# Patient Record
Sex: FEMALE | Race: OTHER
[De-identification: names, ages, dates, MRNs, and addresses within clinical notes are randomized per-mention and may not be internally consistent; named-entity substitution may affect disease eponyms.]

---

## 2017-01-25 ENCOUNTER — RESULT REVIEW (OUTPATIENT)
Age: 27
End: 2017-01-25

## 2017-01-25 ENCOUNTER — APPOINTMENT (OUTPATIENT)
Dept: OBGYN | Facility: CLINIC | Age: 27
End: 2017-01-25

## 2017-01-25 VITALS — BODY MASS INDEX: 32.31 KG/M2 | DIASTOLIC BLOOD PRESSURE: 64 MMHG | WEIGHT: 171 LBS | SYSTOLIC BLOOD PRESSURE: 110 MMHG

## 2017-01-25 LAB
BILIRUB UR QL STRIP: NORMAL
CLARITY UR: CLEAR
COLLECTION METHOD: NORMAL
GLUCOSE UR-MCNC: NORMAL
HCG UR QL: 0.2 EU/DL
HGB UR QL STRIP.AUTO: NORMAL
KETONES UR-MCNC: NORMAL
LEUKOCYTE ESTERASE UR QL STRIP: NORMAL
NITRITE UR QL STRIP: NORMAL
PH UR STRIP: 6
PROT UR STRIP-MCNC: NORMAL
SP GR UR STRIP: 1.02

## 2017-02-12 LAB — MATERNAL ALPHA FETO (NORTH): NORMAL

## 2017-02-14 ENCOUNTER — APPOINTMENT (OUTPATIENT)
Dept: OBGYN | Facility: CLINIC | Age: 27
End: 2017-02-14

## 2017-02-15 ENCOUNTER — APPOINTMENT (OUTPATIENT)
Dept: OBGYN | Facility: CLINIC | Age: 27
End: 2017-02-15

## 2017-02-15 ENCOUNTER — RESULT CHARGE (OUTPATIENT)
Age: 27
End: 2017-02-15

## 2017-02-15 ENCOUNTER — APPOINTMENT (OUTPATIENT)
Dept: ANTEPARTUM | Facility: CLINIC | Age: 27
End: 2017-02-15

## 2017-02-15 VITALS — DIASTOLIC BLOOD PRESSURE: 60 MMHG | SYSTOLIC BLOOD PRESSURE: 106 MMHG | WEIGHT: 172 LBS | BODY MASS INDEX: 32.5 KG/M2

## 2017-02-16 LAB
BILIRUB UR QL STRIP: NORMAL
CLARITY UR: CLEAR
COLLECTION METHOD: NORMAL
GLUCOSE UR-MCNC: NORMAL
HCG UR QL: 0.2 EU/DL
HGB UR QL STRIP.AUTO: NORMAL
KETONES UR-MCNC: NORMAL
LEUKOCYTE ESTERASE UR QL STRIP: NORMAL
NITRITE UR QL STRIP: NORMAL
PH UR STRIP: 7
PROT UR STRIP-MCNC: NORMAL
SP GR UR STRIP: 1.01

## 2017-03-15 ENCOUNTER — OUTPATIENT (OUTPATIENT)
Dept: OUTPATIENT SERVICES | Facility: HOSPITAL | Age: 27
LOS: 1 days | Discharge: HOME | End: 2017-03-15

## 2017-03-15 ENCOUNTER — APPOINTMENT (OUTPATIENT)
Dept: OBGYN | Facility: CLINIC | Age: 27
End: 2017-03-15

## 2017-03-15 ENCOUNTER — RESULT CHARGE (OUTPATIENT)
Age: 27
End: 2017-03-15

## 2017-03-15 VITALS
DIASTOLIC BLOOD PRESSURE: 60 MMHG | SYSTOLIC BLOOD PRESSURE: 100 MMHG | BODY MASS INDEX: 33.04 KG/M2 | HEIGHT: 61 IN | WEIGHT: 175 LBS

## 2017-03-15 LAB
BILIRUB UR QL STRIP: NEGATIVE
CLARITY UR: CLEAR
GLUCOSE UR-MCNC: NEGATIVE
HCG UR QL: 0.2 EU/DL
HGB UR QL STRIP.AUTO: NEGATIVE
KETONES UR-MCNC: NEGATIVE
LEUKOCYTE ESTERASE UR QL STRIP: NEGATIVE
NITRITE UR QL STRIP: NEGATIVE
PH UR STRIP: 6.5
PROT UR STRIP-MCNC: NEGATIVE
SP GR UR STRIP: 1

## 2017-03-17 ENCOUNTER — OTHER (OUTPATIENT)
Age: 27
End: 2017-03-17

## 2017-03-17 LAB
BASOPHILS # BLD: 0.01 TH/MM3
BASOPHILS NFR BLD: 0.1 %
EOSINOPHIL # BLD: 0.06 TH/MM3
EOSINOPHIL NFR BLD: 0.7 %
ERYTHROCYTE [DISTWIDTH] IN BLOOD BY AUTOMATED COUNT: 14 %
GLUCOSE 1H P GLC SERPL-MCNC: 149 MG/DL
GRANULOCYTES # BLD: 6.67 TH/MM3
GRANULOCYTES NFR BLD: 72.6 %
HCT VFR BLD AUTO: 38.5 %
HGB BLD-MCNC: 13 G/DL
IMM GRANULOCYTES # BLD: 0.03 TH/MM3
IMM GRANULOCYTES NFR BLD: 0.3 %
LYMPHOCYTES # BLD: 1.96 TH/MM3
LYMPHOCYTES NFR BLD: 21.3 %
MCH RBC QN AUTO: 33.2 PG
MCHC RBC AUTO-ENTMCNC: 33.8 G/DL
MCV RBC AUTO: 98.5 FL
MONOCYTES # BLD: 0.46 TH/MM3
MONOCYTES NFR BLD: 5 %
PLATELET # BLD: 269 TH/MM3
PMV BLD AUTO: 10.9 FL
RBC # BLD AUTO: 3.91 MIL/MM3
WBC # BLD: 9.19 TH/MM3

## 2017-03-24 ENCOUNTER — RESULT REVIEW (OUTPATIENT)
Age: 27
End: 2017-03-24

## 2017-03-27 LAB
GLUCOSE 1H P GLC SERPL-MCNC: 136 MG/DL
GLUCOSE 2H P GLC SERPL-MCNC: 130 MG/DL
GLUCOSE 3H P GLC SERPL-MCNC: 116 MG/DL
GLUCOSE P FAST SERPL-MCNC: 82 MG/DL

## 2017-04-05 ENCOUNTER — APPOINTMENT (OUTPATIENT)
Dept: OBGYN | Facility: CLINIC | Age: 27
End: 2017-04-05

## 2017-04-05 ENCOUNTER — RESULT CHARGE (OUTPATIENT)
Age: 27
End: 2017-04-05

## 2017-04-05 VITALS — BODY MASS INDEX: 34.01 KG/M2 | DIASTOLIC BLOOD PRESSURE: 60 MMHG | WEIGHT: 180 LBS | SYSTOLIC BLOOD PRESSURE: 94 MMHG

## 2017-04-06 LAB
BILIRUB UR QL STRIP: NORMAL
CLARITY UR: CLEAR
COLLECTION METHOD: NORMAL
GLUCOSE UR-MCNC: NORMAL
HCG UR QL: 0.2 EU/DL
HGB UR QL STRIP.AUTO: NORMAL
KETONES UR-MCNC: NORMAL
LEUKOCYTE ESTERASE UR QL STRIP: NORMAL
NITRITE UR QL STRIP: NORMAL
PH UR STRIP: 6.5
PROT UR STRIP-MCNC: NORMAL
SP GR UR STRIP: 1.01

## 2017-04-25 ENCOUNTER — RESULT CHARGE (OUTPATIENT)
Age: 27
End: 2017-04-25

## 2017-04-25 ENCOUNTER — OUTPATIENT (OUTPATIENT)
Dept: OUTPATIENT SERVICES | Facility: HOSPITAL | Age: 27
LOS: 1 days | Discharge: HOME | End: 2017-04-25

## 2017-04-25 ENCOUNTER — APPOINTMENT (OUTPATIENT)
Dept: OBGYN | Facility: CLINIC | Age: 27
End: 2017-04-25

## 2017-04-25 VITALS
DIASTOLIC BLOOD PRESSURE: 58 MMHG | SYSTOLIC BLOOD PRESSURE: 106 MMHG | HEIGHT: 61 IN | BODY MASS INDEX: 34.74 KG/M2 | WEIGHT: 184 LBS

## 2017-05-04 ENCOUNTER — APPOINTMENT (OUTPATIENT)
Dept: ANTEPARTUM | Facility: CLINIC | Age: 27
End: 2017-05-04

## 2017-05-09 ENCOUNTER — APPOINTMENT (OUTPATIENT)
Dept: OBGYN | Facility: CLINIC | Age: 27
End: 2017-05-09

## 2017-05-09 ENCOUNTER — RESULT CHARGE (OUTPATIENT)
Age: 27
End: 2017-05-09

## 2017-05-09 VITALS
DIASTOLIC BLOOD PRESSURE: 70 MMHG | WEIGHT: 184 LBS | BODY MASS INDEX: 34.74 KG/M2 | SYSTOLIC BLOOD PRESSURE: 112 MMHG | HEIGHT: 61 IN

## 2017-05-10 LAB
BILIRUB UR QL STRIP: NORMAL
CLARITY UR: CLEAR
COLLECTION METHOD: NORMAL
GLUCOSE UR-MCNC: NORMAL
HCG UR QL: 0.2 EU/DL
HGB UR QL STRIP.AUTO: NORMAL
KETONES UR-MCNC: NORMAL
LEUKOCYTE ESTERASE UR QL STRIP: NORMAL
NITRITE UR QL STRIP: NORMAL
PH UR STRIP: 6.5
PROT UR STRIP-MCNC: NORMAL
SP GR UR STRIP: 1.02

## 2017-05-23 ENCOUNTER — APPOINTMENT (OUTPATIENT)
Dept: OBGYN | Facility: CLINIC | Age: 27
End: 2017-05-23

## 2017-05-23 ENCOUNTER — RESULT CHARGE (OUTPATIENT)
Age: 27
End: 2017-05-23

## 2017-05-23 VITALS
DIASTOLIC BLOOD PRESSURE: 62 MMHG | HEIGHT: 61 IN | WEIGHT: 186 LBS | BODY MASS INDEX: 35.12 KG/M2 | SYSTOLIC BLOOD PRESSURE: 120 MMHG

## 2017-05-24 LAB
BILIRUB UR QL STRIP: NORMAL
CLARITY UR: CLEAR
COLLECTION METHOD: NORMAL
GLUCOSE UR-MCNC: NORMAL
HCG UR QL: NORMAL EU/DL
HGB UR QL STRIP.AUTO: NORMAL
KETONES UR-MCNC: NORMAL
LEUKOCYTE ESTERASE UR QL STRIP: NORMAL
NITRITE UR QL STRIP: NORMAL
PH UR STRIP: NORMAL
PROT UR STRIP-MCNC: NORMAL
SP GR UR STRIP: NORMAL

## 2017-06-01 LAB
C TRACH RRNA SPEC QL NAA+PROBE: NOT DETECTED
GP B STREP DNA SPEC QL NAA+PROBE: NORMAL
N GONORRHOEA RRNA SPEC QL NAA+PROBE: NOT DETECTED
S PYO DNA THROAT QL NAA+PROBE: NOT DETECTED
SPECIMEN SOURCE: NORMAL

## 2017-06-07 ENCOUNTER — APPOINTMENT (OUTPATIENT)
Dept: OBGYN | Facility: CLINIC | Age: 27
End: 2017-06-07

## 2017-06-07 ENCOUNTER — RESULT CHARGE (OUTPATIENT)
Age: 27
End: 2017-06-07

## 2017-06-07 VITALS
BODY MASS INDEX: 35.3 KG/M2 | WEIGHT: 187 LBS | DIASTOLIC BLOOD PRESSURE: 60 MMHG | HEIGHT: 61 IN | SYSTOLIC BLOOD PRESSURE: 100 MMHG

## 2017-06-14 ENCOUNTER — RESULT CHARGE (OUTPATIENT)
Age: 27
End: 2017-06-14

## 2017-06-14 ENCOUNTER — APPOINTMENT (OUTPATIENT)
Dept: OBGYN | Facility: CLINIC | Age: 27
End: 2017-06-14

## 2017-06-14 VITALS
BODY MASS INDEX: 35.3 KG/M2 | HEIGHT: 61 IN | DIASTOLIC BLOOD PRESSURE: 64 MMHG | WEIGHT: 187 LBS | SYSTOLIC BLOOD PRESSURE: 100 MMHG

## 2017-06-15 ENCOUNTER — EMERGENCY (EMERGENCY)
Facility: HOSPITAL | Age: 27
LOS: 0 days | Discharge: HOME | End: 2017-06-15
Admitting: OBSTETRICS & GYNECOLOGY

## 2017-06-23 ENCOUNTER — OUTPATIENT (OUTPATIENT)
Dept: OUTPATIENT SERVICES | Facility: HOSPITAL | Age: 27
LOS: 1 days | Discharge: HOME | End: 2017-06-23

## 2017-06-23 ENCOUNTER — APPOINTMENT (OUTPATIENT)
Dept: OBGYN | Facility: CLINIC | Age: 27
End: 2017-06-23

## 2017-06-23 VITALS — SYSTOLIC BLOOD PRESSURE: 108 MMHG | WEIGHT: 192 LBS | DIASTOLIC BLOOD PRESSURE: 68 MMHG | BODY MASS INDEX: 36.28 KG/M2

## 2017-06-27 ENCOUNTER — OUTPATIENT (OUTPATIENT)
Dept: OUTPATIENT SERVICES | Facility: HOSPITAL | Age: 27
LOS: 1 days | Discharge: HOME | End: 2017-06-27

## 2017-06-27 ENCOUNTER — APPOINTMENT (OUTPATIENT)
Dept: ANTEPARTUM | Facility: CLINIC | Age: 27
End: 2017-06-27

## 2017-06-27 DIAGNOSIS — O48.0 POST-TERM PREGNANCY: ICD-10-CM

## 2017-06-28 ENCOUNTER — RESULT CHARGE (OUTPATIENT)
Age: 27
End: 2017-06-28

## 2017-06-28 ENCOUNTER — APPOINTMENT (OUTPATIENT)
Dept: OBGYN | Facility: CLINIC | Age: 27
End: 2017-06-28

## 2017-06-28 ENCOUNTER — OUTPATIENT (OUTPATIENT)
Dept: OUTPATIENT SERVICES | Facility: HOSPITAL | Age: 27
LOS: 1 days | Discharge: HOME | End: 2017-06-28

## 2017-06-28 VITALS
SYSTOLIC BLOOD PRESSURE: 100 MMHG | HEIGHT: 61 IN | WEIGHT: 190 LBS | BODY MASS INDEX: 35.87 KG/M2 | DIASTOLIC BLOOD PRESSURE: 60 MMHG

## 2017-06-28 DIAGNOSIS — O47.1 FALSE LABOR AT OR AFTER 37 COMPLETED WEEKS OF GESTATION: ICD-10-CM

## 2017-06-28 DIAGNOSIS — O26.899 OTHER SPECIFIED PREGNANCY RELATED CONDITIONS, UNSPECIFIED TRIMESTER: ICD-10-CM

## 2017-06-28 DIAGNOSIS — Z03.74 ENCOUNTER FOR SUSPECTED PROBLEM WITH FETAL GROWTH RULED OUT: ICD-10-CM

## 2017-06-28 DIAGNOSIS — Z34.83 ENCOUNTER FOR SUPERVISION OF OTHER NORMAL PREGNANCY, THIRD TRIMESTER: ICD-10-CM

## 2017-06-29 ENCOUNTER — INPATIENT (INPATIENT)
Facility: HOSPITAL | Age: 27
LOS: 1 days | Discharge: HOME | End: 2017-07-01
Attending: OBSTETRICS & GYNECOLOGY | Admitting: OBSTETRICS & GYNECOLOGY

## 2017-07-05 DIAGNOSIS — Z3A.40 40 WEEKS GESTATION OF PREGNANCY: ICD-10-CM

## 2017-07-05 DIAGNOSIS — O48.0 POST-TERM PREGNANCY: ICD-10-CM

## 2017-07-05 DIAGNOSIS — Z33.1 PREGNANT STATE, INCIDENTAL: ICD-10-CM

## 2017-08-02 ENCOUNTER — APPOINTMENT (OUTPATIENT)
Dept: OBGYN | Facility: CLINIC | Age: 27
End: 2017-08-02

## 2017-08-02 ENCOUNTER — RESULT CHARGE (OUTPATIENT)
Age: 27
End: 2017-08-02

## 2017-08-02 ENCOUNTER — OUTPATIENT (OUTPATIENT)
Dept: OUTPATIENT SERVICES | Facility: HOSPITAL | Age: 27
LOS: 1 days | Discharge: HOME | End: 2017-08-02

## 2017-08-02 VITALS — BODY MASS INDEX: 32.03 KG/M2 | WEIGHT: 169.5 LBS | SYSTOLIC BLOOD PRESSURE: 100 MMHG | DIASTOLIC BLOOD PRESSURE: 60 MMHG

## 2017-08-02 LAB
HCG UR QL: NEGATIVE
QUALITY CONTROL: YES

## 2017-08-08 ENCOUNTER — RESULT REVIEW (OUTPATIENT)
Age: 27
End: 2017-08-08

## 2017-08-23 ENCOUNTER — OUTPATIENT (OUTPATIENT)
Dept: OUTPATIENT SERVICES | Facility: HOSPITAL | Age: 27
LOS: 1 days | Discharge: HOME | End: 2017-08-23

## 2017-08-23 ENCOUNTER — APPOINTMENT (OUTPATIENT)
Dept: OBGYN | Facility: CLINIC | Age: 27
End: 2017-08-23

## 2017-08-23 VITALS — DIASTOLIC BLOOD PRESSURE: 60 MMHG | BODY MASS INDEX: 31.74 KG/M2 | WEIGHT: 168 LBS | SYSTOLIC BLOOD PRESSURE: 100 MMHG

## 2017-08-24 LAB
C TRACH RRNA SPEC QL NAA+PROBE: NOT DETECTED
N GONORRHOEA RRNA SPEC QL NAA+PROBE: NOT DETECTED

## 2017-09-27 ENCOUNTER — OUTPATIENT (OUTPATIENT)
Dept: OUTPATIENT SERVICES | Facility: HOSPITAL | Age: 27
LOS: 1 days | Discharge: HOME | End: 2017-09-27

## 2017-09-27 ENCOUNTER — APPOINTMENT (OUTPATIENT)
Dept: OBGYN | Facility: CLINIC | Age: 27
End: 2017-09-27

## 2017-09-27 VITALS — BODY MASS INDEX: 31.35 KG/M2 | WEIGHT: 165.9 LBS | DIASTOLIC BLOOD PRESSURE: 62 MMHG | SYSTOLIC BLOOD PRESSURE: 100 MMHG

## 2017-11-08 DIAGNOSIS — Z34.82 ENCOUNTER FOR SUPERVISION OF OTHER NORMAL PREGNANCY, SECOND TRIMESTER: ICD-10-CM

## 2018-09-11 ENCOUNTER — OUTPATIENT (OUTPATIENT)
Dept: OUTPATIENT SERVICES | Facility: HOSPITAL | Age: 28
LOS: 1 days | Discharge: HOME | End: 2018-09-11

## 2018-09-11 ENCOUNTER — RESULT CHARGE (OUTPATIENT)
Age: 28
End: 2018-09-11

## 2018-09-11 ENCOUNTER — APPOINTMENT (OUTPATIENT)
Dept: OBGYN | Facility: CLINIC | Age: 28
End: 2018-09-11

## 2018-09-11 VITALS
DIASTOLIC BLOOD PRESSURE: 70 MMHG | WEIGHT: 153 LBS | SYSTOLIC BLOOD PRESSURE: 100 MMHG | HEIGHT: 61 IN | BODY MASS INDEX: 28.89 KG/M2

## 2018-09-11 DIAGNOSIS — Z30.432 ENCOUNTER FOR REMOVAL OF INTRAUTERINE CONTRACEPTIVE DEVICE: ICD-10-CM

## 2018-09-12 LAB
HCG UR QL: NEGATIVE
QUALITY CONTROL: NO

## 2018-11-16 ENCOUNTER — APPOINTMENT (OUTPATIENT)
Dept: OBGYN | Facility: CLINIC | Age: 28
End: 2018-11-16

## 2018-12-04 ENCOUNTER — APPOINTMENT (OUTPATIENT)
Dept: OBGYN | Facility: CLINIC | Age: 28
End: 2018-12-04

## 2018-12-04 ENCOUNTER — LABORATORY RESULT (OUTPATIENT)
Age: 28
End: 2018-12-04

## 2018-12-04 ENCOUNTER — OUTPATIENT (OUTPATIENT)
Dept: OUTPATIENT SERVICES | Facility: HOSPITAL | Age: 28
LOS: 1 days | Discharge: HOME | End: 2018-12-04

## 2018-12-04 ENCOUNTER — NON-APPOINTMENT (OUTPATIENT)
Age: 28
End: 2018-12-04

## 2018-12-04 ENCOUNTER — RESULT CHARGE (OUTPATIENT)
Age: 28
End: 2018-12-04

## 2018-12-04 VITALS
HEIGHT: 61 IN | WEIGHT: 157 LBS | DIASTOLIC BLOOD PRESSURE: 70 MMHG | SYSTOLIC BLOOD PRESSURE: 110 MMHG | BODY MASS INDEX: 29.64 KG/M2

## 2018-12-05 DIAGNOSIS — Z34.91 ENCOUNTER FOR SUPERVISION OF NORMAL PREGNANCY, UNSPECIFIED, FIRST TRIMESTER: ICD-10-CM

## 2018-12-06 ENCOUNTER — APPOINTMENT (OUTPATIENT)
Dept: ANTEPARTUM | Facility: CLINIC | Age: 28
End: 2018-12-06

## 2018-12-06 ENCOUNTER — NON-APPOINTMENT (OUTPATIENT)
Age: 28
End: 2018-12-06

## 2018-12-06 LAB
ABO + RH PNL BLD: NORMAL
BACTERIA UR CULT: NORMAL
BASOPHILS # BLD AUTO: 0.05 K/UL
BASOPHILS NFR BLD AUTO: 0.7 %
BILIRUB UR QL STRIP: NORMAL
BLD GP AB SCN SERPL QL: NORMAL
C TRACH RRNA SPEC QL NAA+PROBE: NOT DETECTED
CLARITY UR: CLEAR
COLLECTION METHOD: NORMAL
EOSINOPHIL # BLD AUTO: 0.12 K/UL
EOSINOPHIL NFR BLD AUTO: 1.6 %
GLUCOSE UR-MCNC: NORMAL
HBV SURFACE AG SER QL: NONREACTIVE
HCG UR QL: 0.2 EU/DL
HCT VFR BLD CALC: 39.4 %
HGB BLD-MCNC: 13.5 G/DL
HGB UR QL STRIP.AUTO: NORMAL
HIV1+2 AB SPEC QL IA.RAPID: NONREACTIVE
IMM GRANULOCYTES NFR BLD AUTO: 0.4 %
KETONES UR-MCNC: NORMAL
LEUKOCYTE ESTERASE UR QL STRIP: NORMAL
LYMPHOCYTES # BLD AUTO: 1.82 K/UL
LYMPHOCYTES NFR BLD AUTO: 24.7 %
MAN DIFF?: NORMAL
MCHC RBC-ENTMCNC: 32.3 PG
MCHC RBC-ENTMCNC: 34.3 G/DL
MCV RBC AUTO: 94.3 FL
MONOCYTES # BLD AUTO: 0.42 K/UL
MONOCYTES NFR BLD AUTO: 5.7 %
N GONORRHOEA RRNA SPEC QL NAA+PROBE: NOT DETECTED
NEUTROPHILS # BLD AUTO: 4.94 K/UL
NEUTROPHILS NFR BLD AUTO: 66.9 %
NITRITE UR QL STRIP: NORMAL
PH UR STRIP: 6.5
PLATELET # BLD AUTO: 293 K/UL
PROT UR STRIP-MCNC: NORMAL
RBC # BLD: 4.18 M/UL
RBC # FLD: 13.2 %
RUBV IGG FLD-ACNC: 3.4 INDEX
RUBV IGG SER-IMP: POSITIVE
SOURCE AMPLIFICATION: NORMAL
SP GR UR STRIP: 1.02
T PALLIDUM AB SER QL IA: NEGATIVE
VZV AB TITR SER: POSITIVE
VZV IGG SER IF-ACNC: 280.5 INDEX
WBC # FLD AUTO: 7.38 K/UL

## 2018-12-07 DIAGNOSIS — O35.1XX0 MATERNAL CARE FOR (SUSPECTED) CHROMOSOMAL ABNORMALITY IN FETUS, NOT APPLICABLE OR UNSPECIFIED: ICD-10-CM

## 2018-12-07 LAB
HGB A MFR BLD: 97.6 %
HGB A2 MFR BLD: 2.4 %
HGB FRACT BLD-IMP: NORMAL

## 2018-12-10 LAB
AR GENE MUT ANL BLD/T: NEGATIVE
CFTR MUT TESTED BLD/T: NEGATIVE
LEAD BLD-MCNC: <1 UG/DL

## 2019-01-02 ENCOUNTER — LABORATORY RESULT (OUTPATIENT)
Age: 29
End: 2019-01-02

## 2019-01-02 ENCOUNTER — APPOINTMENT (OUTPATIENT)
Dept: OBGYN | Facility: CLINIC | Age: 29
End: 2019-01-02

## 2019-01-02 ENCOUNTER — OUTPATIENT (OUTPATIENT)
Dept: OUTPATIENT SERVICES | Facility: HOSPITAL | Age: 29
LOS: 1 days | Discharge: HOME | End: 2019-01-02

## 2019-01-02 VITALS
HEIGHT: 61 IN | WEIGHT: 159 LBS | BODY MASS INDEX: 30.02 KG/M2 | DIASTOLIC BLOOD PRESSURE: 60 MMHG | SYSTOLIC BLOOD PRESSURE: 100 MMHG

## 2019-01-02 DIAGNOSIS — Z34.90 ENCOUNTER FOR SUPERVISION OF NORMAL PREGNANCY, UNSPECIFIED, UNSPECIFIED TRIMESTER: ICD-10-CM

## 2019-01-03 DIAGNOSIS — Z34.92 ENCOUNTER FOR SUPERVISION OF NORMAL PREGNANCY, UNSPECIFIED, SECOND TRIMESTER: ICD-10-CM

## 2019-01-30 ENCOUNTER — NON-APPOINTMENT (OUTPATIENT)
Age: 29
End: 2019-01-30

## 2019-01-30 ENCOUNTER — OUTPATIENT (OUTPATIENT)
Dept: OUTPATIENT SERVICES | Facility: HOSPITAL | Age: 29
LOS: 1 days | Discharge: HOME | End: 2019-01-30

## 2019-01-30 ENCOUNTER — RESULT CHARGE (OUTPATIENT)
Age: 29
End: 2019-01-30

## 2019-01-30 ENCOUNTER — APPOINTMENT (OUTPATIENT)
Dept: OBGYN | Facility: CLINIC | Age: 29
End: 2019-01-30

## 2019-01-30 VITALS
SYSTOLIC BLOOD PRESSURE: 100 MMHG | DIASTOLIC BLOOD PRESSURE: 70 MMHG | WEIGHT: 163 LBS | BODY MASS INDEX: 30.78 KG/M2 | HEIGHT: 61 IN

## 2019-01-31 ENCOUNTER — OUTPATIENT (OUTPATIENT)
Dept: OUTPATIENT SERVICES | Facility: HOSPITAL | Age: 29
LOS: 1 days | Discharge: HOME | End: 2019-01-31

## 2019-01-31 ENCOUNTER — APPOINTMENT (OUTPATIENT)
Dept: ANTEPARTUM | Facility: CLINIC | Age: 29
End: 2019-01-31

## 2019-01-31 DIAGNOSIS — Z34.92 ENCOUNTER FOR SUPERVISION OF NORMAL PREGNANCY, UNSPECIFIED, SECOND TRIMESTER: ICD-10-CM

## 2019-02-01 LAB
BILIRUB UR QL STRIP: NORMAL
CLARITY UR: CLEAR
COLLECTION METHOD: NORMAL
GLUCOSE UR-MCNC: NORMAL
HCG UR QL: NORMAL EU/DL
HGB UR QL STRIP.AUTO: NORMAL
KETONES UR-MCNC: NORMAL
LEUKOCYTE ESTERASE UR QL STRIP: NORMAL
NITRITE UR QL STRIP: NORMAL
PH UR STRIP: 6
PROT UR STRIP-MCNC: NORMAL
SP GR UR STRIP: 1.01

## 2019-02-07 DIAGNOSIS — O35.8XX0 MATERNAL CARE FOR OTHER (SUSPECTED) FETAL ABNORMALITY AND DAMAGE, NOT APPLICABLE OR UNSPECIFIED: ICD-10-CM

## 2019-03-05 ENCOUNTER — OUTPATIENT (OUTPATIENT)
Dept: OUTPATIENT SERVICES | Facility: HOSPITAL | Age: 29
LOS: 1 days | Discharge: HOME | End: 2019-03-05

## 2019-03-05 ENCOUNTER — NON-APPOINTMENT (OUTPATIENT)
Age: 29
End: 2019-03-05

## 2019-03-05 ENCOUNTER — OTHER (OUTPATIENT)
Age: 29
End: 2019-03-05

## 2019-03-05 ENCOUNTER — APPOINTMENT (OUTPATIENT)
Dept: OBGYN | Facility: CLINIC | Age: 29
End: 2019-03-05

## 2019-03-05 VITALS — SYSTOLIC BLOOD PRESSURE: 100 MMHG | WEIGHT: 163 LBS | DIASTOLIC BLOOD PRESSURE: 70 MMHG | BODY MASS INDEX: 30.8 KG/M2

## 2019-03-08 DIAGNOSIS — Z34.92 ENCOUNTER FOR SUPERVISION OF NORMAL PREGNANCY, UNSPECIFIED, SECOND TRIMESTER: ICD-10-CM

## 2019-04-01 ENCOUNTER — APPOINTMENT (OUTPATIENT)
Dept: OBGYN | Facility: CLINIC | Age: 29
End: 2019-04-01

## 2019-04-01 ENCOUNTER — OUTPATIENT (OUTPATIENT)
Dept: OUTPATIENT SERVICES | Facility: HOSPITAL | Age: 29
LOS: 1 days | Discharge: HOME | End: 2019-04-01

## 2019-04-01 ENCOUNTER — NON-APPOINTMENT (OUTPATIENT)
Age: 29
End: 2019-04-01

## 2019-04-01 VITALS
SYSTOLIC BLOOD PRESSURE: 110 MMHG | DIASTOLIC BLOOD PRESSURE: 80 MMHG | WEIGHT: 169.44 LBS | BODY MASS INDEX: 31.99 KG/M2 | HEIGHT: 61 IN

## 2019-04-02 DIAGNOSIS — Z34.93 ENCOUNTER FOR SUPERVISION OF NORMAL PREGNANCY, UNSPECIFIED, THIRD TRIMESTER: ICD-10-CM

## 2019-04-02 LAB
BILIRUB UR QL STRIP: NORMAL
CLARITY UR: CLEAR
COLLECTION METHOD: NORMAL
GLUCOSE UR-MCNC: NORMAL
HCG UR QL: 0.2 EU/DL
HGB UR QL STRIP.AUTO: NORMAL
KETONES UR-MCNC: NORMAL
LEUKOCYTE ESTERASE UR QL STRIP: NORMAL
NITRITE UR QL STRIP: NORMAL
PH UR STRIP: 5
PROT UR STRIP-MCNC: NORMAL
SP GR UR STRIP: 1.01

## 2019-04-15 ENCOUNTER — NON-APPOINTMENT (OUTPATIENT)
Age: 29
End: 2019-04-15

## 2019-04-15 ENCOUNTER — OTHER (OUTPATIENT)
Age: 29
End: 2019-04-15

## 2019-04-15 ENCOUNTER — OUTPATIENT (OUTPATIENT)
Dept: OUTPATIENT SERVICES | Facility: HOSPITAL | Age: 29
LOS: 1 days | Discharge: HOME | End: 2019-04-15

## 2019-04-15 ENCOUNTER — RESULT CHARGE (OUTPATIENT)
Age: 29
End: 2019-04-15

## 2019-04-15 ENCOUNTER — APPOINTMENT (OUTPATIENT)
Dept: OBGYN | Facility: CLINIC | Age: 29
End: 2019-04-15

## 2019-04-15 VITALS
HEIGHT: 61 IN | BODY MASS INDEX: 31.53 KG/M2 | DIASTOLIC BLOOD PRESSURE: 64 MMHG | SYSTOLIC BLOOD PRESSURE: 100 MMHG | WEIGHT: 167 LBS

## 2019-04-15 LAB
BASOPHILS # BLD AUTO: 0.03 K/UL
BASOPHILS NFR BLD AUTO: 0.3 %
EOSINOPHIL # BLD AUTO: 0.12 K/UL
EOSINOPHIL NFR BLD AUTO: 1.1 %
GLUCOSE 1H P 50 G GLC PO SERPL-MCNC: 121 MG/DL
HCT VFR BLD CALC: 37 %
HGB BLD-MCNC: 12.5 G/DL
IMM GRANULOCYTES NFR BLD AUTO: 0.8 %
LYMPHOCYTES # BLD AUTO: 1.63 K/UL
LYMPHOCYTES NFR BLD AUTO: 15.4 %
MAN DIFF?: NORMAL
MCHC RBC-ENTMCNC: 32.6 PG
MCHC RBC-ENTMCNC: 33.8 G/DL
MCV RBC AUTO: 96.4 FL
MONOCYTES # BLD AUTO: 0.55 K/UL
MONOCYTES NFR BLD AUTO: 5.2 %
NEUTROPHILS # BLD AUTO: 8.17 K/UL
NEUTROPHILS NFR BLD AUTO: 77.2 %
PLATELET # BLD AUTO: 270 K/UL
RBC # BLD: 3.84 M/UL
RBC # FLD: 13.4 %
WBC # FLD AUTO: 10.59 K/UL

## 2019-04-15 RX ORDER — TERCONAZOLE 4 MG/G
0.4 CREAM VAGINAL
Qty: 1 | Refills: 1 | Status: ACTIVE | COMMUNITY
Start: 2019-04-15 | End: 1900-01-01

## 2019-04-18 DIAGNOSIS — Z34.93 ENCOUNTER FOR SUPERVISION OF NORMAL PREGNANCY, UNSPECIFIED, THIRD TRIMESTER: ICD-10-CM

## 2019-04-22 ENCOUNTER — APPOINTMENT (OUTPATIENT)
Dept: ANTEPARTUM | Facility: CLINIC | Age: 29
End: 2019-04-22

## 2019-04-22 DIAGNOSIS — O35.8XX0 MATERNAL CARE FOR OTHER (SUSPECTED) FETAL ABNORMALITY AND DAMAGE, NOT APPLICABLE OR UNSPECIFIED: ICD-10-CM

## 2019-04-29 ENCOUNTER — APPOINTMENT (OUTPATIENT)
Dept: OBGYN | Facility: CLINIC | Age: 29
End: 2019-04-29

## 2019-04-29 ENCOUNTER — NON-APPOINTMENT (OUTPATIENT)
Age: 29
End: 2019-04-29

## 2019-04-29 ENCOUNTER — RESULT CHARGE (OUTPATIENT)
Age: 29
End: 2019-04-29

## 2019-04-29 ENCOUNTER — OUTPATIENT (OUTPATIENT)
Dept: OUTPATIENT SERVICES | Facility: HOSPITAL | Age: 29
LOS: 1 days | Discharge: HOME | End: 2019-04-29

## 2019-04-29 VITALS — WEIGHT: 172 LBS | BODY MASS INDEX: 32.5 KG/M2 | DIASTOLIC BLOOD PRESSURE: 60 MMHG | SYSTOLIC BLOOD PRESSURE: 90 MMHG

## 2019-04-30 DIAGNOSIS — Z34.93 ENCOUNTER FOR SUPERVISION OF NORMAL PREGNANCY, UNSPECIFIED, THIRD TRIMESTER: ICD-10-CM

## 2019-05-14 ENCOUNTER — OUTPATIENT (OUTPATIENT)
Dept: OUTPATIENT SERVICES | Facility: HOSPITAL | Age: 29
LOS: 1 days | Discharge: HOME | End: 2019-05-14

## 2019-05-14 ENCOUNTER — APPOINTMENT (OUTPATIENT)
Dept: OBGYN | Facility: CLINIC | Age: 29
End: 2019-05-14
Payer: MEDICAID

## 2019-05-14 ENCOUNTER — NON-APPOINTMENT (OUTPATIENT)
Age: 29
End: 2019-05-14

## 2019-05-14 VITALS
SYSTOLIC BLOOD PRESSURE: 110 MMHG | BODY MASS INDEX: 32.47 KG/M2 | HEIGHT: 61 IN | DIASTOLIC BLOOD PRESSURE: 62 MMHG | WEIGHT: 172 LBS

## 2019-05-14 PROCEDURE — 99213 OFFICE O/P EST LOW 20 MIN: CPT

## 2019-05-15 DIAGNOSIS — Z34.90 ENCOUNTER FOR SUPERVISION OF NORMAL PREGNANCY, UNSPECIFIED, UNSPECIFIED TRIMESTER: ICD-10-CM

## 2019-05-15 LAB
C TRACH RRNA SPEC QL NAA+PROBE: NOT DETECTED
N GONORRHOEA RRNA SPEC QL NAA+PROBE: NOT DETECTED
SOURCE AMPLIFICATION: NORMAL

## 2019-05-16 LAB
BASOPHILS # BLD AUTO: 0.04 K/UL
BASOPHILS NFR BLD AUTO: 0.4 %
EOSINOPHIL # BLD AUTO: 0.16 K/UL
EOSINOPHIL NFR BLD AUTO: 1.5 %
GP B STREP DNA SPEC QL NAA+PROBE: NORMAL
GP B STREP DNA SPEC QL NAA+PROBE: NOT DETECTED
HCT VFR BLD CALC: 39.8 %
HGB BLD-MCNC: 13.5 G/DL
HIV1+2 AB SPEC QL IA.RAPID: NONREACTIVE
IMM GRANULOCYTES NFR BLD AUTO: 0.8 %
LYMPHOCYTES # BLD AUTO: 2.49 K/UL
LYMPHOCYTES NFR BLD AUTO: 23.9 %
MAN DIFF?: NORMAL
MCHC RBC-ENTMCNC: 32.1 PG
MCHC RBC-ENTMCNC: 33.9 G/DL
MCV RBC AUTO: 94.8 FL
MONOCYTES # BLD AUTO: 0.6 K/UL
MONOCYTES NFR BLD AUTO: 5.7 %
NEUTROPHILS # BLD AUTO: 7.07 K/UL
NEUTROPHILS NFR BLD AUTO: 67.7 %
PLATELET # BLD AUTO: 302 K/UL
RBC # BLD: 4.2 M/UL
RBC # FLD: 13.2 %
SOURCE GBS: NORMAL
WBC # FLD AUTO: 10.44 K/UL

## 2019-05-17 LAB
MEV IGG FLD QL IA: <5 AU/ML
MEV IGG+IGM SER-IMP: NEGATIVE

## 2019-05-21 ENCOUNTER — OUTPATIENT (OUTPATIENT)
Dept: OUTPATIENT SERVICES | Facility: HOSPITAL | Age: 29
LOS: 1 days | Discharge: HOME | End: 2019-05-21

## 2019-05-21 ENCOUNTER — RESULT CHARGE (OUTPATIENT)
Age: 29
End: 2019-05-21

## 2019-05-21 ENCOUNTER — APPOINTMENT (OUTPATIENT)
Dept: OBGYN | Facility: CLINIC | Age: 29
End: 2019-05-21
Payer: MEDICAID

## 2019-05-21 VITALS — BODY MASS INDEX: 32.69 KG/M2 | DIASTOLIC BLOOD PRESSURE: 70 MMHG | WEIGHT: 173 LBS | SYSTOLIC BLOOD PRESSURE: 120 MMHG

## 2019-05-21 PROCEDURE — 99213 OFFICE O/P EST LOW 20 MIN: CPT

## 2019-05-22 LAB
BILIRUB UR QL STRIP: NORMAL
CLARITY UR: CLEAR
COLLECTION METHOD: NORMAL
GLUCOSE UR-MCNC: NORMAL
HCG UR QL: 0.2 EU/DL
HGB UR QL STRIP.AUTO: NORMAL
KETONES UR-MCNC: NORMAL
LEUKOCYTE ESTERASE UR QL STRIP: NORMAL
NITRITE UR QL STRIP: NORMAL
PH UR STRIP: 7.5
PROT UR STRIP-MCNC: NORMAL
SP GR UR STRIP: 1.01

## 2019-05-23 DIAGNOSIS — Z34.80 ENCOUNTER FOR SUPERVISION OF OTHER NORMAL PREGNANCY, UNSPECIFIED TRIMESTER: ICD-10-CM

## 2019-05-29 ENCOUNTER — OUTPATIENT (OUTPATIENT)
Dept: OUTPATIENT SERVICES | Facility: HOSPITAL | Age: 29
LOS: 1 days | Discharge: HOME | End: 2019-05-29

## 2019-05-29 ENCOUNTER — NON-APPOINTMENT (OUTPATIENT)
Age: 29
End: 2019-05-29

## 2019-05-29 ENCOUNTER — APPOINTMENT (OUTPATIENT)
Dept: OBGYN | Facility: CLINIC | Age: 29
End: 2019-05-29
Payer: MEDICAID

## 2019-05-29 ENCOUNTER — RESULT CHARGE (OUTPATIENT)
Age: 29
End: 2019-05-29

## 2019-05-29 VITALS
BODY MASS INDEX: 33.23 KG/M2 | DIASTOLIC BLOOD PRESSURE: 64 MMHG | WEIGHT: 176 LBS | SYSTOLIC BLOOD PRESSURE: 116 MMHG | HEIGHT: 61 IN

## 2019-05-29 LAB
BILIRUB UR QL STRIP: NORMAL
CLARITY UR: CLEAR
COLLECTION METHOD: NORMAL
GLUCOSE UR-MCNC: NORMAL
HCG UR QL: 0.2 EU/DL
HGB UR QL STRIP.AUTO: NORMAL
KETONES UR-MCNC: NORMAL
LEUKOCYTE ESTERASE UR QL STRIP: NORMAL
NITRITE UR QL STRIP: NORMAL
PH UR STRIP: 6
PROT UR STRIP-MCNC: NORMAL
SP GR UR STRIP: 1.01

## 2019-05-29 PROCEDURE — 99213 OFFICE O/P EST LOW 20 MIN: CPT

## 2019-05-30 DIAGNOSIS — Z34.80 ENCOUNTER FOR SUPERVISION OF OTHER NORMAL PREGNANCY, UNSPECIFIED TRIMESTER: ICD-10-CM

## 2019-06-05 ENCOUNTER — NON-APPOINTMENT (OUTPATIENT)
Age: 29
End: 2019-06-05

## 2019-06-05 ENCOUNTER — OUTPATIENT (OUTPATIENT)
Dept: OUTPATIENT SERVICES | Facility: HOSPITAL | Age: 29
LOS: 1 days | Discharge: HOME | End: 2019-06-05

## 2019-06-05 ENCOUNTER — RESULT CHARGE (OUTPATIENT)
Age: 29
End: 2019-06-05

## 2019-06-05 ENCOUNTER — APPOINTMENT (OUTPATIENT)
Dept: OBGYN | Facility: CLINIC | Age: 29
End: 2019-06-05
Payer: MEDICAID

## 2019-06-05 VITALS
DIASTOLIC BLOOD PRESSURE: 70 MMHG | BODY MASS INDEX: 33.61 KG/M2 | SYSTOLIC BLOOD PRESSURE: 104 MMHG | WEIGHT: 178 LBS | HEIGHT: 61 IN

## 2019-06-05 PROCEDURE — 99213 OFFICE O/P EST LOW 20 MIN: CPT

## 2019-06-06 ENCOUNTER — INPATIENT (INPATIENT)
Facility: HOSPITAL | Age: 29
LOS: 1 days | Discharge: HOME | End: 2019-06-08
Attending: OBSTETRICS & GYNECOLOGY | Admitting: OBSTETRICS & GYNECOLOGY
Payer: MEDICAID

## 2019-06-06 VITALS — TEMPERATURE: 98 F | SYSTOLIC BLOOD PRESSURE: 107 MMHG | HEART RATE: 90 BPM | DIASTOLIC BLOOD PRESSURE: 73 MMHG

## 2019-06-06 DIAGNOSIS — Z34.80 ENCOUNTER FOR SUPERVISION OF OTHER NORMAL PREGNANCY, UNSPECIFIED TRIMESTER: ICD-10-CM

## 2019-06-06 LAB
AMPHET UR-MCNC: NEGATIVE — SIGNIFICANT CHANGE UP
APPEARANCE UR: CLEAR — SIGNIFICANT CHANGE UP
BARBITURATES UR SCN-MCNC: NEGATIVE — SIGNIFICANT CHANGE UP
BASOPHILS # BLD AUTO: 0.05 K/UL — SIGNIFICANT CHANGE UP (ref 0–0.2)
BASOPHILS NFR BLD AUTO: 0.5 % — SIGNIFICANT CHANGE UP (ref 0–1)
BENZODIAZ UR-MCNC: NEGATIVE — SIGNIFICANT CHANGE UP
BILIRUB UR-MCNC: NEGATIVE — SIGNIFICANT CHANGE UP
BLD GP AB SCN SERPL QL: SIGNIFICANT CHANGE UP
BUPRENORPHINE SCREEN, URINE RESULT: NEGATIVE — SIGNIFICANT CHANGE UP
COCAINE METAB.OTHER UR-MCNC: NEGATIVE — SIGNIFICANT CHANGE UP
COLOR SPEC: YELLOW — SIGNIFICANT CHANGE UP
DIFF PNL FLD: NEGATIVE — SIGNIFICANT CHANGE UP
EOSINOPHIL # BLD AUTO: 0.11 K/UL — SIGNIFICANT CHANGE UP (ref 0–0.7)
EOSINOPHIL NFR BLD AUTO: 1.1 % — SIGNIFICANT CHANGE UP (ref 0–8)
GLUCOSE UR QL: NEGATIVE MG/DL — SIGNIFICANT CHANGE UP
HCT VFR BLD CALC: 38 % — SIGNIFICANT CHANGE UP (ref 37–47)
HGB BLD-MCNC: 13.3 G/DL — SIGNIFICANT CHANGE UP (ref 12–16)
IMM GRANULOCYTES NFR BLD AUTO: 1 % — HIGH (ref 0.1–0.3)
KETONES UR-MCNC: NEGATIVE — SIGNIFICANT CHANGE UP
L&D DRUG SCREEN, URINE: SIGNIFICANT CHANGE UP
LEUKOCYTE ESTERASE UR-ACNC: NEGATIVE — SIGNIFICANT CHANGE UP
LYMPHOCYTES # BLD AUTO: 1.95 K/UL — SIGNIFICANT CHANGE UP (ref 1.2–3.4)
LYMPHOCYTES # BLD AUTO: 18.7 % — LOW (ref 20.5–51.1)
MCHC RBC-ENTMCNC: 32.5 PG — HIGH (ref 27–31)
MCHC RBC-ENTMCNC: 35 G/DL — SIGNIFICANT CHANGE UP (ref 32–37)
MCV RBC AUTO: 92.9 FL — SIGNIFICANT CHANGE UP (ref 81–99)
METHADONE UR-MCNC: NEGATIVE — SIGNIFICANT CHANGE UP
MONOCYTES # BLD AUTO: 0.55 K/UL — SIGNIFICANT CHANGE UP (ref 0.1–0.6)
MONOCYTES NFR BLD AUTO: 5.3 % — SIGNIFICANT CHANGE UP (ref 1.7–9.3)
NEUTROPHILS # BLD AUTO: 7.69 K/UL — HIGH (ref 1.4–6.5)
NEUTROPHILS NFR BLD AUTO: 73.4 % — SIGNIFICANT CHANGE UP (ref 42.2–75.2)
NITRITE UR-MCNC: NEGATIVE — SIGNIFICANT CHANGE UP
NRBC # BLD: 0 /100 WBCS — SIGNIFICANT CHANGE UP (ref 0–0)
OPIATES UR-MCNC: NEGATIVE — SIGNIFICANT CHANGE UP
OXYCODONE UR-MCNC: NEGATIVE — SIGNIFICANT CHANGE UP
PCP UR-MCNC: NEGATIVE — SIGNIFICANT CHANGE UP
PH UR: 7 — SIGNIFICANT CHANGE UP (ref 5–8)
PLATELET # BLD AUTO: 256 K/UL — SIGNIFICANT CHANGE UP (ref 130–400)
PRENATAL SYPHILIS TEST: SIGNIFICANT CHANGE UP
PROPOXYPHENE QUALITATIVE URINE RESULT: NEGATIVE — SIGNIFICANT CHANGE UP
PROT UR-MCNC: NEGATIVE MG/DL — SIGNIFICANT CHANGE UP
RBC # BLD: 4.09 M/UL — LOW (ref 4.2–5.4)
RBC # FLD: 13.4 % — SIGNIFICANT CHANGE UP (ref 11.5–14.5)
SP GR SPEC: 1.01 — SIGNIFICANT CHANGE UP (ref 1.01–1.03)
UROBILINOGEN FLD QL: 0.2 MG/DL — SIGNIFICANT CHANGE UP (ref 0.2–0.2)
WBC # BLD: 10.45 K/UL — SIGNIFICANT CHANGE UP (ref 4.8–10.8)
WBC # FLD AUTO: 10.45 K/UL — SIGNIFICANT CHANGE UP (ref 4.8–10.8)

## 2019-06-06 PROCEDURE — 59409 OBSTETRICAL CARE: CPT | Mod: U9

## 2019-06-06 RX ORDER — SIMETHICONE 80 MG/1
80 TABLET, CHEWABLE ORAL EVERY 4 HOURS
Refills: 0 | Status: DISCONTINUED | OUTPATIENT
Start: 2019-06-06 | End: 2019-06-08

## 2019-06-06 RX ORDER — OXYTOCIN 10 UNIT/ML
2 VIAL (ML) INJECTION
Qty: 30 | Refills: 0 | Status: DISCONTINUED | OUTPATIENT
Start: 2019-06-06 | End: 2019-06-08

## 2019-06-06 RX ORDER — BENZOCAINE 10 %
1 GEL (GRAM) MUCOUS MEMBRANE EVERY 6 HOURS
Refills: 0 | Status: DISCONTINUED | OUTPATIENT
Start: 2019-06-06 | End: 2019-06-08

## 2019-06-06 RX ORDER — OXYCODONE HYDROCHLORIDE 5 MG/1
5 TABLET ORAL
Refills: 0 | Status: DISCONTINUED | OUTPATIENT
Start: 2019-06-06 | End: 2019-06-08

## 2019-06-06 RX ORDER — SODIUM CHLORIDE 9 MG/ML
3 INJECTION INTRAMUSCULAR; INTRAVENOUS; SUBCUTANEOUS EVERY 8 HOURS
Refills: 0 | Status: DISCONTINUED | OUTPATIENT
Start: 2019-06-06 | End: 2019-06-08

## 2019-06-06 RX ORDER — OXYTOCIN 10 UNIT/ML
41.67 VIAL (ML) INJECTION
Qty: 20 | Refills: 0 | Status: DISCONTINUED | OUTPATIENT
Start: 2019-06-06 | End: 2019-06-08

## 2019-06-06 RX ORDER — DIBUCAINE 1 %
1 OINTMENT (GRAM) RECTAL EVERY 6 HOURS
Refills: 0 | Status: DISCONTINUED | OUTPATIENT
Start: 2019-06-06 | End: 2019-06-08

## 2019-06-06 RX ORDER — OXYTOCIN 10 UNIT/ML
333.33 VIAL (ML) INJECTION
Qty: 20 | Refills: 0 | Status: DISCONTINUED | OUTPATIENT
Start: 2019-06-06 | End: 2019-06-08

## 2019-06-06 RX ORDER — DIPHENHYDRAMINE HCL 50 MG
25 CAPSULE ORAL EVERY 6 HOURS
Refills: 0 | Status: DISCONTINUED | OUTPATIENT
Start: 2019-06-06 | End: 2019-06-08

## 2019-06-06 RX ORDER — LANOLIN
1 OINTMENT (GRAM) TOPICAL EVERY 6 HOURS
Refills: 0 | Status: DISCONTINUED | OUTPATIENT
Start: 2019-06-06 | End: 2019-06-08

## 2019-06-06 RX ORDER — AER TRAVELER 0.5 G/1
1 SOLUTION RECTAL; TOPICAL EVERY 4 HOURS
Refills: 0 | Status: DISCONTINUED | OUTPATIENT
Start: 2019-06-06 | End: 2019-06-08

## 2019-06-06 RX ORDER — IBUPROFEN 200 MG
600 TABLET ORAL EVERY 6 HOURS
Refills: 0 | Status: COMPLETED | OUTPATIENT
Start: 2019-06-06 | End: 2020-05-04

## 2019-06-06 RX ORDER — KETOROLAC TROMETHAMINE 30 MG/ML
30 SYRINGE (ML) INJECTION ONCE
Refills: 0 | Status: DISCONTINUED | OUTPATIENT
Start: 2019-06-06 | End: 2019-06-06

## 2019-06-06 RX ORDER — IBUPROFEN 200 MG
600 TABLET ORAL EVERY 6 HOURS
Refills: 0 | Status: DISCONTINUED | OUTPATIENT
Start: 2019-06-06 | End: 2019-06-08

## 2019-06-06 RX ORDER — DOCUSATE SODIUM 100 MG
100 CAPSULE ORAL
Refills: 0 | Status: DISCONTINUED | OUTPATIENT
Start: 2019-06-06 | End: 2019-06-08

## 2019-06-06 RX ORDER — ACETAMINOPHEN 500 MG
975 TABLET ORAL EVERY 6 HOURS
Refills: 0 | Status: DISCONTINUED | OUTPATIENT
Start: 2019-06-07 | End: 2019-06-08

## 2019-06-06 RX ORDER — OXYCODONE HYDROCHLORIDE 5 MG/1
5 TABLET ORAL ONCE
Refills: 0 | Status: DISCONTINUED | OUTPATIENT
Start: 2019-06-06 | End: 2019-06-08

## 2019-06-06 RX ORDER — SODIUM CHLORIDE 9 MG/ML
1000 INJECTION, SOLUTION INTRAVENOUS
Refills: 0 | Status: DISCONTINUED | OUTPATIENT
Start: 2019-06-06 | End: 2019-06-06

## 2019-06-06 RX ADMIN — Medication 30 MILLIGRAM(S): at 15:19

## 2019-06-06 RX ADMIN — SODIUM CHLORIDE 3 MILLILITER(S): 9 INJECTION INTRAMUSCULAR; INTRAVENOUS; SUBCUTANEOUS at 22:40

## 2019-06-06 NOTE — OB PROVIDER DELIVERY SUMMARY - NSPROVIDERDELIVERYNOTE_OBGYN_ALL_OB_FT
Patient was fully dilated and pushing. Head delivered OA, restituted to MICHELLE, Anterior shoulder delivered, followed by the posterior shoulder, rest of the body without complications. Infant placed on mothers abdomen, cord clamped and cut. Cord segment and blood obtained. Placenta delivered, intact. Fundus massaged and firm, with good hemostasis. Pitocin given postpartum. Vaginal vault, perineum, and cervix inspected, and no lacerations were noted. Live female infant delivered. APGARs 9/9.     Dr. Amato and Dr. Barron aware

## 2019-06-06 NOTE — OB PROVIDER H&P - HISTORY OF PRESENT ILLNESS
The pt is a 28y y/o  @ 39.2  weeks  edc  19  dated by  lmp confirmed by sonogram  who presents to labor & delivery for induction of labor, favorable cervix at term.  Pt reports + fetal movement no srom no vaginal bleeding.

## 2019-06-06 NOTE — OB PROVIDER H&P - NSHPPHYSICALEXAM_GEN_ALL_CORE
PHYSICAL EXAM:  T(F): 97.7 (06-06 @ 08:43)  HR: 90 (06-06 @ 08:43)  BP: 107/73 (06-06 @ 08:43)  Constitutional: AAOx3, NAD  Abdomen: Soft, gravid, nontender, no palpable ctx      TOCO none  VE 2-80/-1

## 2019-06-06 NOTE — PROGRESS NOTE ADULT - SUBJECTIVE AND OBJECTIVE BOX
Pt examined at the bedside no complaints at this time    Vital Signs Last 24 Hrs  T(C): 36.9 (2019 12:41), Max: 36.9 (2019 12:41)  T(F): 98.42 (2019 12:41), Max: 98.42 (2019 12:41)  HR: 96 (2019 13:15) (80 - 108)  BP: 108/57 (2019 13:15) (80/42 - 119/63)  RR: 18 (2019 08:52) (18 - 18)      TOCO Q 2- 4 mins  VE 4/80/-1 vertex    A/P  39.2 week P2, favorable cervix term, IOL  - continue efm & toco monitoring  - IV hydration  - IV pitocin  - expect

## 2019-06-06 NOTE — PROGRESS NOTE ADULT - SUBJECTIVE AND OBJECTIVE BOX
addendum:    AROM  VE unchanged   clear fluid obtained  Dr Barron in attendance  continue current care

## 2019-06-06 NOTE — OB PROVIDER H&P - ASSESSMENT
28y   @39.2 weeks favorable cervix at term for pitocin induction of labor  Admit to Labor & delivery  IV hydration  admission labs  Clear fluids  EFM & TOCO monitoring  analgesia prn  Dr Barron aware

## 2019-06-07 LAB
BASOPHILS # BLD AUTO: 0.04 K/UL — SIGNIFICANT CHANGE UP (ref 0–0.2)
BASOPHILS NFR BLD AUTO: 0.4 % — SIGNIFICANT CHANGE UP (ref 0–1)
EOSINOPHIL # BLD AUTO: 0.2 K/UL — SIGNIFICANT CHANGE UP (ref 0–0.7)
EOSINOPHIL NFR BLD AUTO: 1.8 % — SIGNIFICANT CHANGE UP (ref 0–8)
HCT VFR BLD CALC: 37.2 % — SIGNIFICANT CHANGE UP (ref 37–47)
HGB BLD-MCNC: 12.5 G/DL — SIGNIFICANT CHANGE UP (ref 12–16)
IMM GRANULOCYTES NFR BLD AUTO: 0.7 % — HIGH (ref 0.1–0.3)
LYMPHOCYTES # BLD AUTO: 1.84 K/UL — SIGNIFICANT CHANGE UP (ref 1.2–3.4)
LYMPHOCYTES # BLD AUTO: 16.4 % — LOW (ref 20.5–51.1)
MCHC RBC-ENTMCNC: 31.6 PG — HIGH (ref 27–31)
MCHC RBC-ENTMCNC: 33.6 G/DL — SIGNIFICANT CHANGE UP (ref 32–37)
MCV RBC AUTO: 94.2 FL — SIGNIFICANT CHANGE UP (ref 81–99)
MONOCYTES # BLD AUTO: 0.78 K/UL — HIGH (ref 0.1–0.6)
MONOCYTES NFR BLD AUTO: 7 % — SIGNIFICANT CHANGE UP (ref 1.7–9.3)
NEUTROPHILS # BLD AUTO: 8.25 K/UL — HIGH (ref 1.4–6.5)
NEUTROPHILS NFR BLD AUTO: 73.7 % — SIGNIFICANT CHANGE UP (ref 42.2–75.2)
NRBC # BLD: 0 /100 WBCS — SIGNIFICANT CHANGE UP (ref 0–0)
PLATELET # BLD AUTO: 240 K/UL — SIGNIFICANT CHANGE UP (ref 130–400)
RBC # BLD: 3.95 M/UL — LOW (ref 4.2–5.4)
RBC # FLD: 13.5 % — SIGNIFICANT CHANGE UP (ref 11.5–14.5)
WBC # BLD: 11.19 K/UL — HIGH (ref 4.8–10.8)
WBC # FLD AUTO: 11.19 K/UL — HIGH (ref 4.8–10.8)

## 2019-06-07 PROCEDURE — 99231 SBSQ HOSP IP/OBS SF/LOW 25: CPT

## 2019-06-07 RX ADMIN — Medication 600 MILLIGRAM(S): at 05:45

## 2019-06-07 RX ADMIN — Medication 1 TABLET(S): at 11:40

## 2019-06-07 NOTE — PROGRESS NOTE ADULT - SUBJECTIVE AND OBJECTIVE BOX
Chief Complaint: Postpartum day1    HPI: Pt doing well, pain well controlled. No overnight events, no acute complaints. She is ambulating, voiding, tolerating regular diet without difficulty. Denies N/V, chest pain, SOB, fevers, chills, excessive bleeding.     ROS: Denies cardiovascular or respiratory symptoms    PAST MEDICAL & SURGICAL HISTORY:  No pertinent past medical history  No significant past surgical history      Physical Exam  Vital Signs Last 24 Hrs  T(F): 98.7 (06 Jun 2019 23:55), Max: 98.7 (06 Jun 2019 23:55)  HR: 84 (06 Jun 2019 23:55) (69 - 160)  BP: 106/58 (06 Jun 2019 23:55) (80/42 - 121/63)  RR: 18 (06 Jun 2019 23:55) (18 - 18)    Physical exam:  General - AAOx3, NAD  Heart - S1S2, RRR  Lungs - CTA BL  Abdomen:  - Soft, nontender, nondistended, BS+  - Fundus firm, nontender, below the umbilicus  Pelvis/Vagina - minimal rubra  Extremities: No calf tenderness, no swelling, no erythema    Labs:                        13.3   10.45 )-----------( 256      ( 06 Jun 2019 09:07 )             38.0     Antibody Screen: NEG (06-06-19 @ 09:35)    Prenatal Syphilis Test: Nonreact (06-06-19 @ 09:07)

## 2019-06-07 NOTE — PROGRESS NOTE ADULT - ASSESSMENT
27yo now P3 s/p  PPD1, doing well    -postpartum care as necessary  -Pain management  -Regular diet  -PO hydration and ambulation encouraged  -f/u PP CBC    Dr. Amato and OB attending to be made aware

## 2019-06-08 ENCOUNTER — TRANSCRIPTION ENCOUNTER (OUTPATIENT)
Age: 29
End: 2019-06-08

## 2019-06-08 VITALS
TEMPERATURE: 97 F | RESPIRATION RATE: 19 BRPM | SYSTOLIC BLOOD PRESSURE: 110 MMHG | DIASTOLIC BLOOD PRESSURE: 63 MMHG | HEART RATE: 72 BPM

## 2019-06-08 PROCEDURE — 99238 HOSP IP/OBS DSCHRG MGMT 30/<: CPT

## 2019-06-08 RX ORDER — DIPHENHYDRAMINE HCL 50 MG
1 CAPSULE ORAL
Qty: 0 | Refills: 0 | DISCHARGE
Start: 2019-06-08

## 2019-06-08 RX ORDER — IBUPROFEN 200 MG
1 TABLET ORAL
Qty: 0 | Refills: 0 | DISCHARGE
Start: 2019-06-08

## 2019-06-08 RX ADMIN — Medication 0.5 MILLILITER(S): at 11:30

## 2019-06-08 RX ADMIN — Medication 325 MILLIGRAM(S): at 10:05

## 2019-06-08 RX ADMIN — Medication 600 MILLIGRAM(S): at 00:00

## 2019-06-08 NOTE — DISCHARGE NOTE OB - MEDICATION SUMMARY - MEDICATIONS TO TAKE
I will START or STAY ON the medications listed below when I get home from the hospital:    ibuprofen 600 mg oral tablet  -- 1 tab(s) by mouth every 6 hours, As Needed  -- Indication: For pain    diphenhydrAMINE 25 mg oral capsule  -- 1 cap(s) by mouth every 6 hours, As needed, Pruritus  -- Indication: For rash/itching    Prenatal Multivitamins with Folic Acid 1 mg oral tablet  -- 1 tab(s) by mouth once a day  -- Indication: For multivitamin

## 2019-06-08 NOTE — DISCHARGE NOTE OB - PATIENT PORTAL LINK FT
You can access the [facility] Patient Portal, offered by [facility], by registering with the following website: [email or web address]

## 2019-06-08 NOTE — PROGRESS NOTE ADULT - SUBJECTIVE AND OBJECTIVE BOX
DEVI HOLLIDAYZULEIMARUPINDER  28y  Female    PGY2 Note:    Pt recovering well, no acute complaints. Erythema and rash over right arm- developed after gardening few days prior. Denies dizziness/lightheadedness/SOB/difficulty breathing/flushing/swelling/fever/chills/nausea/vomiting.     Ambulating: Yes  Voiding: Yes  Flatus: Yes  Bowel movements: Yes   Breast or bottle feeding: Breast feeding   Diet: Regular    MEDICATIONS  (STANDING):  acetaminophen   Tablet .. 975 milliGRAM(s) Oral every 6 hours  ibuprofen  Tablet. 600 milliGRAM(s) Oral every 6 hours  measles/mumps/rubella Vaccine 0.5 milliLiter(s) SubCutaneous once  oxytocin Infusion 333.333 milliUNIT(s)/Min (1000 mL/Hr) IV Continuous <Continuous>  oxytocin Infusion 2 milliUNIT(s)/Min (2 mL/Hr) IV Continuous <Continuous>  oxytocin Infusion 41.667 milliUNIT(s)/Min (125 mL/Hr) IV Continuous <Continuous>  prenatal multivitamin 1 Tablet(s) Oral daily  sodium chloride 0.9% lock flush 3 milliLiter(s) IV Push every 8 hours    MEDICATIONS  (PRN):  benzocaine 20%/menthol 0.5% Spray 1 Spray(s) Topical every 6 hours PRN for Perineal discomfort  dibucaine 1% Ointment 1 Application(s) Topical every 6 hours PRN Perineal discomfort  diphenhydrAMINE 25 milliGRAM(s) Oral every 6 hours PRN Pruritus  docusate sodium 100 milliGRAM(s) Oral two times a day PRN For stool softening  lanolin Ointment 1 Application(s) Topical every 6 hours PRN nipple soreness  oxyCODONE    IR 5 milliGRAM(s) Oral every 3 hours PRN Moderate Pain (4 - 6)  oxyCODONE    IR 5 milliGRAM(s) Oral once PRN Severe Pain (7 -10)  simethicone 80 milliGRAM(s) Chew every 4 hours PRN Gas  witch hazel Pads 1 Application(s) Topical every 4 hours PRN Perineal discomfort      PAST MEDICAL & SURGICAL HISTORY:  No pertinent past medical history  No significant past surgical history      Physical Exam  Vital Signs Last 24 Hrs  T(C): 36.7 (08 Jun 2019 00:11), Max: 36.7 (08 Jun 2019 00:11)  T(F): 98 (08 Jun 2019 00:11), Max: 98 (08 Jun 2019 00:11)  HR: 71 (08 Jun 2019 00:11) (66 - 114)  BP: 117/67 (08 Jun 2019 00:11) (101/69 - 123/68)  RR: 18 (08 Jun 2019 00:11) (18 - 19)    Gen: AAOx3, NAD  Heart/Lungs: NAD  Fundus: firm, below umbilicus   Abd: Soft, nontender, nondistended, BS+  Lochia: minimal   Ext: No calf tenderness, no swelling     Local exam: right arm- excoriation marks+ no local rise of temp/tenderness/edema     Labs:                        12.5   11.19 )-----------( 240      ( 07 Jun 2019 05:24 )             37.2                         13.3   10.45 )-----------( 256      ( 06 Jun 2019 09:07 )             38.0

## 2019-06-08 NOTE — PROGRESS NOTE ADULT - ASSESSMENT
A/P: S/P  PPD2, with rash likely secondary to sensitivity to poison ivy vs insect bite, no signs of infection, doing well  - benadryl PRN   - encourage ambulation/PO hydration  - monitor vitals, bleeding  - d/c home   - f/u in 6 weeks for PP visit

## 2019-06-10 ENCOUNTER — EMERGENCY (EMERGENCY)
Facility: HOSPITAL | Age: 29
LOS: 1 days | Discharge: HOME | End: 2019-06-10
Attending: EMERGENCY MEDICINE
Payer: MEDICAID

## 2019-06-10 VITALS
SYSTOLIC BLOOD PRESSURE: 121 MMHG | HEART RATE: 78 BPM | TEMPERATURE: 99 F | RESPIRATION RATE: 18 BRPM | OXYGEN SATURATION: 98 % | DIASTOLIC BLOOD PRESSURE: 70 MMHG

## 2019-06-10 VITALS — HEIGHT: 62 IN | WEIGHT: 177.91 LBS

## 2019-06-10 DIAGNOSIS — R21 RASH AND OTHER NONSPECIFIC SKIN ERUPTION: ICD-10-CM

## 2019-06-10 PROCEDURE — 99283 EMERGENCY DEPT VISIT LOW MDM: CPT

## 2019-06-10 RX ORDER — HYDROXYZINE HCL 10 MG
1 TABLET ORAL
Qty: 21 | Refills: 0
Start: 2019-06-10 | End: 2019-06-16

## 2019-06-10 NOTE — ED PROVIDER NOTE - ATTENDING CONTRIBUTION TO CARE
Pt is a 27yo female with rash to trunk and arms - no fever.  Itching not improved with Benadryl.  No other complaints.  Recently delivered baby.    Exam: soft NT abdomen, scattered blanching papules with excoriations, larger plaque by R antecubital fossa  Imp: rash  Plan: antihistamines, steroids

## 2019-06-10 NOTE — ED PROVIDER NOTE - CARE PROVIDER_API CALL
Enrike Carr)  Dermatology; Internal Medicine  [address] [place] [address]  Phone: [phone]  Fax: [phone]  Follow Up Time: 1-3 Days

## 2019-06-10 NOTE — ED PROVIDER NOTE - NS ED ROS FT
General: No fever, no chills  Eyes:  No visual changes, eye pain or discharge.  ENMT:  No hearing changes, pain, no sore throat or runny nose, no difficulty swallowing  Cardiac:  No chest pain, SOB or edema. No chest pain with exertion.  Respiratory:  No cough or respiratory distress. No hemoptysis. No history of asthma or RAD.  GI:  No nausea, vomiting, diarrhea or abdominal pain.  :  No dysuria, frequency or burning.  MS:  No myalgia, muscle weakness, joint pain or back pain.  Neuro:  No headache or weakness.  No LOC.  Skin:  + priuritic skin rash.   Endocrine: No history of thyroid disease or diabetes.

## 2019-06-10 NOTE — ED PROVIDER NOTE - OBJECTIVE STATEMENT
28 y.o. F with no PMH with priuritic rash over her body. Pt had 2 separate rashes, 1 on her arms which developed 2 weeks ago since she was working in the garden, the other on her trunk which she received as she was in L&D, she denies fever, recent URI

## 2019-06-10 NOTE — ED PROVIDER NOTE - PHYSICAL EXAMINATION
CONSTITUTIONAL: Well-developed; well-nourished; in no acute distress.   SKIN: +scattered blanching, erythematous, with excoriation, warm, dry  HEAD: Normocephalic; atraumatic.  EYES: PERRL, EOMI, no conjunctival erythema  ENT: No nasal discharge; airway clear.  NECK: Supple; non tender.  CARD: S1, S2 normal; no murmurs, gallops, or rubs. Regular rate and rhythm.   RESP: No wheezes, rales or rhonchi.   ABD: soft ntnd  EXT: Normal ROM.  No clubbing, cyanosis or edema.   LYMPH: No acute cervical adenopathy.  NEURO: Alert, oriented, grossly unremarkable  PSYCH: Cooperative, appropriate.

## 2019-06-10 NOTE — ED PROVIDER NOTE - NSFOLLOWUPINSTRUCTIONS_ED_ALL_ED_FT
Rash    A rash is a change in the color of the skin. A rash can also change the way your skin feels. There are many different conditions and factors that can cause a rash, most of which are not dangerous. Make sure to follow up with your primary care physician or a dermatologist as instructed by your health care provider.    SEEK IMMEDIATE MEDICAL CARE IF YOU HAVE ANY OF THE FOLLOWING SYMPTOMS: fever, blisters, a rash inside your mouth, vaginal or anal pain, or altered mental status.
[facility]

## 2019-06-11 PROBLEM — Z78.9 OTHER SPECIFIED HEALTH STATUS: Chronic | Status: ACTIVE | Noted: 2019-06-06

## 2019-06-12 DIAGNOSIS — Z3A.39 39 WEEKS GESTATION OF PREGNANCY: ICD-10-CM

## 2019-06-15 DIAGNOSIS — R21 RASH AND OTHER NONSPECIFIC SKIN ERUPTION: ICD-10-CM

## 2019-07-17 ENCOUNTER — OUTPATIENT (OUTPATIENT)
Dept: OUTPATIENT SERVICES | Facility: HOSPITAL | Age: 29
LOS: 1 days | Discharge: HOME | End: 2019-07-17

## 2019-07-17 ENCOUNTER — APPOINTMENT (OUTPATIENT)
Dept: OBGYN | Facility: CLINIC | Age: 29
End: 2019-07-17
Payer: MEDICAID

## 2019-07-17 ENCOUNTER — RESULT CHARGE (OUTPATIENT)
Age: 29
End: 2019-07-17

## 2019-07-17 VITALS
WEIGHT: 166 LBS | DIASTOLIC BLOOD PRESSURE: 62 MMHG | HEIGHT: 61 IN | SYSTOLIC BLOOD PRESSURE: 110 MMHG | BODY MASS INDEX: 31.34 KG/M2

## 2019-07-17 DIAGNOSIS — Z30.432 ENCOUNTER FOR REMOVAL OF INTRAUTERINE CONTRACEPTIVE DEVICE: ICD-10-CM

## 2019-07-17 LAB — HCG UR QL: NEGATIVE

## 2019-07-17 PROCEDURE — 58300 INSERT INTRAUTERINE DEVICE: CPT

## 2019-07-17 NOTE — HISTORY OF PRESENT ILLNESS
[Postpartum Follow Up] : postpartum follow up [Complications:___] : no complications [] : delivered by vaginal delivery [Breastfeeding] : currently nursing [BF with Difficulty] : nursing without difficulty [Back to Normal] : is back to normal in size [None] : no vaginal bleeding [Normal] : the vagina was normal [Doing Well] : is doing well [No Sign of Infection] : is showing no signs of infection [de-identified] : f/u 5 weeks-see IUD insertion note

## 2019-07-17 NOTE — HISTORY OF PRESENT ILLNESS
[Postpartum Follow Up] : postpartum follow up [Complications:___] : no complications [] : delivered by vaginal delivery [Breastfeeding] : currently nursing [BF with Difficulty] : nursing without difficulty [Back to Normal] : is back to normal in size [None] : no vaginal bleeding [Normal] : the vagina was normal [Doing Well] : is doing well [No Sign of Infection] : is showing no signs of infection [de-identified] : f/u 5 weeks-see IUD insertion note

## 2019-07-17 NOTE — PROCEDURE
[IUD Placement] : intrauterine device (IUD) placement [Prevention of Pregnancy] : prevention of pregnancy [Risks] : risks [Benefits] : benefits [Alternatives] : alternatives [Patient] : patient [Infection] : infection [Bleeding] : bleeding [Pain] : pain [Expulsion] : expulsion [Failure] : failure [Uterine Perforation] : uterine perforation [CONSENT OBTAINED] : written consent was obtained prior to the procedure. [Neg Pregnancy Test] : a pregnancy test was negative [Betadine] : Prepped with Betadine [Tenaculum] : a single toothed tenaculum [Easy Passage] : allowed easy passage of a uterine sound without dilation [Mirena IUD] : The Mirena IUD was inserted past the internal cervical os. The IUD was then gently inserted upwards toward the fundus.  The IUD strings were cut to an appropriate length. [Tolerated Well] : the patient tolerated the procedure well [No Complications] : there were no complications [None] : no post-procedure medications given

## 2019-07-19 DIAGNOSIS — Z30.430 ENCOUNTER FOR INSERTION OF INTRAUTERINE CONTRACEPTIVE DEVICE: ICD-10-CM

## 2019-07-23 ENCOUNTER — MOBILE ON CALL (OUTPATIENT)
Age: 29
End: 2019-07-23

## 2019-08-21 ENCOUNTER — APPOINTMENT (OUTPATIENT)
Dept: OBGYN | Facility: CLINIC | Age: 29
End: 2019-08-21
Payer: MEDICAID

## 2019-08-21 ENCOUNTER — OUTPATIENT (OUTPATIENT)
Dept: OUTPATIENT SERVICES | Facility: HOSPITAL | Age: 29
LOS: 1 days | Discharge: HOME | End: 2019-08-21

## 2019-08-21 VITALS
WEIGHT: 169 LBS | DIASTOLIC BLOOD PRESSURE: 70 MMHG | HEIGHT: 61 IN | SYSTOLIC BLOOD PRESSURE: 110 MMHG | BODY MASS INDEX: 31.91 KG/M2

## 2019-08-21 PROCEDURE — 99212 OFFICE O/P EST SF 10 MIN: CPT

## 2019-08-21 NOTE — PHYSICAL EXAM
[Normal] : uterus [No Bleeding] : there was no active vaginal bleeding [Uterine Adnexae] : were not tender and not enlarged [FreeTextEntry5] : iud string seen

## 2019-08-27 DIAGNOSIS — Z97.5 PRESENCE OF (INTRAUTERINE) CONTRACEPTIVE DEVICE: ICD-10-CM

## 2019-09-25 ENCOUNTER — OUTPATIENT (OUTPATIENT)
Dept: OUTPATIENT SERVICES | Facility: HOSPITAL | Age: 29
LOS: 1 days | Discharge: HOME | End: 2019-09-25

## 2019-09-25 ENCOUNTER — LABORATORY RESULT (OUTPATIENT)
Age: 29
End: 2019-09-25

## 2019-09-25 ENCOUNTER — APPOINTMENT (OUTPATIENT)
Dept: OBGYN | Facility: CLINIC | Age: 29
End: 2019-09-25
Payer: COMMERCIAL

## 2019-09-25 VITALS
DIASTOLIC BLOOD PRESSURE: 72 MMHG | SYSTOLIC BLOOD PRESSURE: 112 MMHG | HEIGHT: 61 IN | BODY MASS INDEX: 32.28 KG/M2 | WEIGHT: 171 LBS

## 2019-09-25 DIAGNOSIS — N89.8 OTHER SPECIFIED NONINFLAMMATORY DISORDERS OF VAGINA: ICD-10-CM

## 2019-09-25 PROCEDURE — 57061 DESTRUCTION VAG LESIONS SMPL: CPT

## 2019-09-25 PROCEDURE — 99213 OFFICE O/P EST LOW 20 MIN: CPT | Mod: 25

## 2019-09-25 RX ORDER — METRONIDAZOLE 500 MG/1
500 TABLET ORAL TWICE DAILY
Qty: 20 | Refills: 0 | Status: ACTIVE | COMMUNITY
Start: 2019-09-25 | End: 1900-01-01

## 2019-09-25 NOTE — PHYSICAL EXAM
[Normal] : uterus [No Bleeding] : there was no active vaginal bleeding [Uterine Adnexae] : were not tender and not enlarged [FreeTextEntry4] : 2cm vaginal cyst with yellow appearance-painful to touch

## 2020-07-11 NOTE — ED ADULT NURSE NOTE - PAIN: BODY LOCATION
The patient is a 69y Female with a history of bile duct cancer complaining of nausea and vomiting.
arm itching

## 2020-09-09 ENCOUNTER — APPOINTMENT (OUTPATIENT)
Dept: OBGYN | Facility: CLINIC | Age: 30
End: 2020-09-09

## 2020-10-20 ENCOUNTER — RESULT CHARGE (OUTPATIENT)
Age: 30
End: 2020-10-20

## 2020-10-20 ENCOUNTER — APPOINTMENT (OUTPATIENT)
Dept: OBGYN | Facility: CLINIC | Age: 30
End: 2020-10-20
Payer: COMMERCIAL

## 2020-10-20 ENCOUNTER — OUTPATIENT (OUTPATIENT)
Dept: OUTPATIENT SERVICES | Facility: HOSPITAL | Age: 30
LOS: 1 days | Discharge: HOME | End: 2020-10-20

## 2020-10-20 VITALS
BODY MASS INDEX: 32.28 KG/M2 | HEIGHT: 61 IN | SYSTOLIC BLOOD PRESSURE: 116 MMHG | DIASTOLIC BLOOD PRESSURE: 80 MMHG | WEIGHT: 171 LBS

## 2020-10-20 DIAGNOSIS — N89.8 OTHER SPECIFIED NONINFLAMMATORY DISORDERS OF VAGINA: ICD-10-CM

## 2020-10-20 PROCEDURE — 58301 REMOVE INTRAUTERINE DEVICE: CPT

## 2020-10-20 PROCEDURE — 99395 PREV VISIT EST AGE 18-39: CPT | Mod: 25

## 2020-10-20 NOTE — PROCEDURE
[IUD Removal] : intrauterine device (IUD) removal [Fertility Desired] : fertility desired [Risks] : risks [Benefits] : benefits [Patient] : patient [Alternatives] : alternatives [Speculum Placed] : speculum placed [IUD Removed - Forceps] : IUD removed - forceps [IUD Discarded] : IUD discarded [Tolerated Well] : Patient tolerated the procedure well

## 2020-10-20 NOTE — HISTORY OF PRESENT ILLNESS
[Patient declined PAP Smear] : Patient declined PAP Smear [PapSmeardate] : 2018 [FreeTextEntry1] : iud to be removed

## 2020-10-21 LAB
HCG UR QL: NEGATIVE
QUALITY CONTROL: YES

## 2020-10-26 LAB — HPV HIGH+LOW RISK DNA PNL CVX: NOT DETECTED

## 2021-01-20 ENCOUNTER — RESULT CHARGE (OUTPATIENT)
Age: 31
End: 2021-01-20

## 2021-01-20 ENCOUNTER — NON-APPOINTMENT (OUTPATIENT)
Age: 31
End: 2021-01-20

## 2021-01-20 ENCOUNTER — OUTPATIENT (OUTPATIENT)
Dept: OUTPATIENT SERVICES | Facility: HOSPITAL | Age: 31
LOS: 1 days | Discharge: HOME | End: 2021-01-20

## 2021-01-20 ENCOUNTER — APPOINTMENT (OUTPATIENT)
Dept: OBGYN | Facility: CLINIC | Age: 31
End: 2021-01-20
Payer: MEDICAID

## 2021-01-20 VITALS
HEIGHT: 61 IN | DIASTOLIC BLOOD PRESSURE: 80 MMHG | BODY MASS INDEX: 33.42 KG/M2 | WEIGHT: 177 LBS | SYSTOLIC BLOOD PRESSURE: 102 MMHG

## 2021-01-20 PROCEDURE — 76815 OB US LIMITED FETUS(S): CPT | Mod: 26

## 2021-01-20 PROCEDURE — 99214 OFFICE O/P EST MOD 30 MIN: CPT | Mod: 25

## 2021-01-21 ENCOUNTER — OUTPATIENT (OUTPATIENT)
Dept: OUTPATIENT SERVICES | Facility: HOSPITAL | Age: 31
LOS: 1 days | Discharge: HOME | End: 2021-01-21

## 2021-01-21 ENCOUNTER — ASOB RESULT (OUTPATIENT)
Age: 31
End: 2021-01-21

## 2021-01-21 ENCOUNTER — APPOINTMENT (OUTPATIENT)
Dept: ANTEPARTUM | Facility: CLINIC | Age: 31
End: 2021-01-21
Payer: MEDICAID

## 2021-01-21 PROCEDURE — 76813 OB US NUCHAL MEAS 1 GEST: CPT | Mod: 26

## 2021-01-22 ENCOUNTER — NON-APPOINTMENT (OUTPATIENT)
Age: 31
End: 2021-01-22

## 2021-01-29 DIAGNOSIS — Z34.90 ENCOUNTER FOR SUPERVISION OF NORMAL PREGNANCY, UNSPECIFIED, UNSPECIFIED TRIMESTER: ICD-10-CM

## 2021-02-01 ENCOUNTER — APPOINTMENT (OUTPATIENT)
Dept: OBGYN | Facility: CLINIC | Age: 31
End: 2021-02-01

## 2021-02-01 ENCOUNTER — NON-APPOINTMENT (OUTPATIENT)
Age: 31
End: 2021-02-01

## 2021-02-10 ENCOUNTER — NON-APPOINTMENT (OUTPATIENT)
Age: 31
End: 2021-02-10

## 2021-02-10 ENCOUNTER — OUTPATIENT (OUTPATIENT)
Dept: OUTPATIENT SERVICES | Facility: HOSPITAL | Age: 31
LOS: 1 days | Discharge: HOME | End: 2021-02-10

## 2021-02-10 ENCOUNTER — RESULT CHARGE (OUTPATIENT)
Age: 31
End: 2021-02-10

## 2021-02-10 ENCOUNTER — APPOINTMENT (OUTPATIENT)
Dept: OBGYN | Facility: CLINIC | Age: 31
End: 2021-02-10
Payer: MEDICAID

## 2021-02-10 VITALS
HEIGHT: 61 IN | SYSTOLIC BLOOD PRESSURE: 108 MMHG | BODY MASS INDEX: 34.36 KG/M2 | DIASTOLIC BLOOD PRESSURE: 62 MMHG | WEIGHT: 182 LBS

## 2021-02-10 PROCEDURE — 99213 OFFICE O/P EST LOW 20 MIN: CPT

## 2021-02-20 NOTE — OB RN PATIENT PROFILE - VISION (WITH CORRECTIVE LENSES IF THE PATIENT USUALLY WEARS THEM):
Aptt 187.6 Normal vision: sees adequately in most situations; can see medication labels, newsprint troponin 0.26

## 2021-03-09 ENCOUNTER — NON-APPOINTMENT (OUTPATIENT)
Age: 31
End: 2021-03-09

## 2021-03-10 ENCOUNTER — NON-APPOINTMENT (OUTPATIENT)
Age: 31
End: 2021-03-10

## 2021-03-10 ENCOUNTER — OUTPATIENT (OUTPATIENT)
Dept: OUTPATIENT SERVICES | Facility: HOSPITAL | Age: 31
LOS: 1 days | Discharge: HOME | End: 2021-03-10

## 2021-03-10 ENCOUNTER — APPOINTMENT (OUTPATIENT)
Dept: OBGYN | Facility: CLINIC | Age: 31
End: 2021-03-10
Payer: MEDICAID

## 2021-03-10 ENCOUNTER — RESULT CHARGE (OUTPATIENT)
Age: 31
End: 2021-03-10

## 2021-03-10 VITALS
WEIGHT: 179 LBS | BODY MASS INDEX: 33.79 KG/M2 | SYSTOLIC BLOOD PRESSURE: 100 MMHG | HEIGHT: 61 IN | DIASTOLIC BLOOD PRESSURE: 60 MMHG

## 2021-03-10 PROCEDURE — 99213 OFFICE O/P EST LOW 20 MIN: CPT

## 2021-03-10 RX ORDER — NITROFURANTOIN (MONOHYDRATE/MACROCRYSTALS) 25; 75 MG/1; MG/1
100 CAPSULE ORAL
Qty: 14 | Refills: 0 | Status: ACTIVE | COMMUNITY
Start: 2021-03-10 | End: 1900-01-01

## 2021-03-18 ENCOUNTER — NON-APPOINTMENT (OUTPATIENT)
Age: 31
End: 2021-03-18

## 2021-03-18 DIAGNOSIS — Z34.90 ENCOUNTER FOR SUPERVISION OF NORMAL PREGNANCY, UNSPECIFIED, UNSPECIFIED TRIMESTER: ICD-10-CM

## 2021-03-21 ENCOUNTER — OUTPATIENT (OUTPATIENT)
Dept: EMERGENCY DEPT | Facility: HOSPITAL | Age: 31
LOS: 1 days | Discharge: HOME | End: 2021-03-21
Payer: MEDICAID

## 2021-03-21 VITALS
HEART RATE: 114 BPM | WEIGHT: 175.05 LBS | SYSTOLIC BLOOD PRESSURE: 113 MMHG | RESPIRATION RATE: 18 BRPM | TEMPERATURE: 100 F | OXYGEN SATURATION: 99 % | DIASTOLIC BLOOD PRESSURE: 74 MMHG | HEIGHT: 62 IN

## 2021-03-21 VITALS — HEART RATE: 96 BPM | DIASTOLIC BLOOD PRESSURE: 62 MMHG | SYSTOLIC BLOOD PRESSURE: 96 MMHG

## 2021-03-21 DIAGNOSIS — R51.9 HEADACHE, UNSPECIFIED: ICD-10-CM

## 2021-03-21 DIAGNOSIS — M54.9 DORSALGIA, UNSPECIFIED: ICD-10-CM

## 2021-03-21 DIAGNOSIS — R50.9 FEVER, UNSPECIFIED: ICD-10-CM

## 2021-03-21 DIAGNOSIS — O26.892 OTHER SPECIFIED PREGNANCY RELATED CONDITIONS, SECOND TRIMESTER: ICD-10-CM

## 2021-03-21 DIAGNOSIS — Z98.890 OTHER SPECIFIED POSTPROCEDURAL STATES: Chronic | ICD-10-CM

## 2021-03-21 LAB
ALBUMIN SERPL ELPH-MCNC: 3.4 G/DL — LOW (ref 3.5–5.2)
ALP SERPL-CCNC: 82 U/L — SIGNIFICANT CHANGE UP (ref 30–115)
ALT FLD-CCNC: 15 U/L — SIGNIFICANT CHANGE UP (ref 0–41)
ANION GAP SERPL CALC-SCNC: 13 MMOL/L — SIGNIFICANT CHANGE UP (ref 7–14)
AST SERPL-CCNC: 17 U/L — SIGNIFICANT CHANGE UP (ref 0–41)
BASOPHILS # BLD AUTO: 0.01 K/UL — SIGNIFICANT CHANGE UP (ref 0–0.2)
BASOPHILS NFR BLD AUTO: 0.2 % — SIGNIFICANT CHANGE UP (ref 0–1)
BILIRUB SERPL-MCNC: <0.2 MG/DL — SIGNIFICANT CHANGE UP (ref 0.2–1.2)
BUN SERPL-MCNC: 5 MG/DL — LOW (ref 10–20)
CALCIUM SERPL-MCNC: 8.5 MG/DL — SIGNIFICANT CHANGE UP (ref 8.5–10.1)
CHLORIDE SERPL-SCNC: 103 MMOL/L — SIGNIFICANT CHANGE UP (ref 98–110)
CO2 SERPL-SCNC: 20 MMOL/L — SIGNIFICANT CHANGE UP (ref 17–32)
CREAT SERPL-MCNC: 0.5 MG/DL — LOW (ref 0.7–1.5)
EOSINOPHIL # BLD AUTO: 0 K/UL — SIGNIFICANT CHANGE UP (ref 0–0.7)
EOSINOPHIL NFR BLD AUTO: 0 % — SIGNIFICANT CHANGE UP (ref 0–8)
GLUCOSE SERPL-MCNC: 89 MG/DL — SIGNIFICANT CHANGE UP (ref 70–99)
HCT VFR BLD CALC: 37.2 % — SIGNIFICANT CHANGE UP (ref 37–47)
HGB BLD-MCNC: 13.1 G/DL — SIGNIFICANT CHANGE UP (ref 12–16)
IMM GRANULOCYTES NFR BLD AUTO: 0.3 % — SIGNIFICANT CHANGE UP (ref 0.1–0.3)
LACTATE SERPL-SCNC: 1.6 MMOL/L — SIGNIFICANT CHANGE UP (ref 0.7–2)
LIDOCAIN IGE QN: 34 U/L — SIGNIFICANT CHANGE UP (ref 7–60)
LYMPHOCYTES # BLD AUTO: 0.8 K/UL — LOW (ref 1.2–3.4)
LYMPHOCYTES # BLD AUTO: 13.5 % — LOW (ref 20.5–51.1)
MCHC RBC-ENTMCNC: 32.4 PG — HIGH (ref 27–31)
MCHC RBC-ENTMCNC: 35.2 G/DL — SIGNIFICANT CHANGE UP (ref 32–37)
MCV RBC AUTO: 92.1 FL — SIGNIFICANT CHANGE UP (ref 81–99)
MONOCYTES # BLD AUTO: 0.41 K/UL — SIGNIFICANT CHANGE UP (ref 0.1–0.6)
MONOCYTES NFR BLD AUTO: 6.9 % — SIGNIFICANT CHANGE UP (ref 1.7–9.3)
NEUTROPHILS # BLD AUTO: 4.68 K/UL — SIGNIFICANT CHANGE UP (ref 1.4–6.5)
NEUTROPHILS NFR BLD AUTO: 79.1 % — HIGH (ref 42.2–75.2)
NRBC # BLD: 0 /100 WBCS — SIGNIFICANT CHANGE UP (ref 0–0)
PLATELET # BLD AUTO: 215 K/UL — SIGNIFICANT CHANGE UP (ref 130–400)
POTASSIUM SERPL-MCNC: 3.8 MMOL/L — SIGNIFICANT CHANGE UP (ref 3.5–5)
POTASSIUM SERPL-SCNC: 3.8 MMOL/L — SIGNIFICANT CHANGE UP (ref 3.5–5)
PROT SERPL-MCNC: 6 G/DL — SIGNIFICANT CHANGE UP (ref 6–8)
RAPID RVP RESULT: DETECTED
RBC # BLD: 4.04 M/UL — LOW (ref 4.2–5.4)
RBC # FLD: 13.1 % — SIGNIFICANT CHANGE UP (ref 11.5–14.5)
SARS-COV-2 RNA SPEC QL NAA+PROBE: DETECTED
SODIUM SERPL-SCNC: 136 MMOL/L — SIGNIFICANT CHANGE UP (ref 135–146)
WBC # BLD: 5.92 K/UL — SIGNIFICANT CHANGE UP (ref 4.8–10.8)
WBC # FLD AUTO: 5.92 K/UL — SIGNIFICANT CHANGE UP (ref 4.8–10.8)

## 2021-03-21 PROCEDURE — 71045 X-RAY EXAM CHEST 1 VIEW: CPT | Mod: 26

## 2021-03-21 PROCEDURE — 76815 OB US LIMITED FETUS(S): CPT | Mod: 26

## 2021-03-21 PROCEDURE — 99283 EMERGENCY DEPT VISIT LOW MDM: CPT | Mod: 25

## 2021-03-21 PROCEDURE — 99284 EMERGENCY DEPT VISIT MOD MDM: CPT

## 2021-03-21 RX ORDER — ACETAMINOPHEN 500 MG
650 TABLET ORAL ONCE
Refills: 0 | Status: COMPLETED | OUTPATIENT
Start: 2021-03-21 | End: 2021-03-21

## 2021-03-21 RX ORDER — SODIUM CHLORIDE 9 MG/ML
1000 INJECTION INTRAMUSCULAR; INTRAVENOUS; SUBCUTANEOUS ONCE
Refills: 0 | Status: COMPLETED | OUTPATIENT
Start: 2021-03-21 | End: 2021-03-21

## 2021-03-21 RX ADMIN — Medication 650 MILLIGRAM(S): at 19:30

## 2021-03-21 RX ADMIN — SODIUM CHLORIDE 1000 MILLILITER(S): 9 INJECTION INTRAMUSCULAR; INTRAVENOUS; SUBCUTANEOUS at 19:30

## 2021-03-21 NOTE — ED PROVIDER NOTE - OBJECTIVE STATEMENT
30 year old female, 19 weeks pregnant , presents with cough, nasal congestion, and chills for a few days. Pt admits developed lower abdominal pain last night, feeling like contractions. Pain persistent now. No fever, nausea, vomiting, diarrhea, vaginal bleeding or discharge, or urinary symptoms. no cp or sob.

## 2021-03-21 NOTE — ED PROVIDER NOTE - NS ED ROS FT
Review of Systems:  	•	CONSTITUTIONAL - no fever, no diaphoresis, + chills  	•	SKIN - no rash  	•	HEMATOLOGIC - no bleeding, no bruising  	•	EYES - no eye pain, no blurry vision  	•	ENT - no change in hearing, no sore throat, no ear pain or tinnitus  	•	RESPIRATORY - no shortness of breath, + cough  	•	CARDIAC - no chest pain, no palpitations  	•	GI - + abd pain, no nausea, no vomiting, no diarrhea, no constipation  	•	GENITO-URINARY - no discharge, no dysuria; no hematuria, no increased urinary frequency  	•	MUSCULOSKELETAL - no joint paint, no swelling, no redness  	•	NEUROLOGIC - no weakness, no headache, no paresthesias, no LOC

## 2021-03-21 NOTE — OB PROVIDER TRIAGE NOTE - HISTORY OF PRESENT ILLNESS
29yo  @19w6d, JACINTA: 20 by first tana staceyjosy, presented to the ED complaining of fever, chills, headache and cough for past 3d. Found to be COVID pos. Also reports contractions q30m with 6/10 pain since last night. Denies vaginal bleeding, leakage of fluid. Good FM. Denies nausea, vomiting, dysuria. Last seen in office on 3/10. Denies any complications during this pregnancy.

## 2021-03-21 NOTE — OB PROVIDER TRIAGE NOTE - DOMESTIC TRAVEL HIGH RISK QUESTION
medications since your last visit including any over-the-counter medicines, vitamins, or herbal medicines? no   Are you having any side effects from any of your medications? -  no  Have you stopped taking any of your medications? Is so, why? -  no    Have you seen any other physician or provider since your last visit? No  Have you had any other diagnostic tests since your last visit? No  Have you been seen in the emergency room and/or had an admission to a hospital since we last saw you? No  Have you had your routine dental cleaning in the past 6 months? no    Have you activated your [employer] account? If not, what are your barriers? No:      Patient Care Team:  Chery Chris CNP as PCP - General (Pediatrics)    Medical History Review  Past Medical, Family, and Social History reviewed and does not contribute to the patient presenting condition    Health Maintenance   Topic Date Due    Hepatitis A vaccine 0-18 (2 of 2 - Standard Series) 02/01/2018    Lead screen 1 and 2 (2) 07/31/2018    Polio vaccine 0-18 (4 of 4 - All-IPV Series) 07/31/2020    Measles,Mumps,Rubella (MMR) vaccine (2 of 2) 07/31/2020    Varicella vaccine 1-18 (2 of 2 - 2 Dose Childhood Series) 07/31/2020    DTaP/Tdap/Td vaccine (5 - DTaP) 07/31/2020    Meningococcal (MCV) Vaccine Age 0-22 Years (1 of 2) 07/31/2027    Hepatitis B vaccine 0-18  Completed    Hib vaccine 0-6  Completed    Pneumococcal (PCV) vaccine 0-5  Completed    Rotavirus vaccine 0-6  Completed    Flu vaccine  Completed      Objective:      Growth parameters are noted and are appropriate for age. General:   alert, appears stated age and cooperative   Skin:   normal   Head:   normal fontanelles, normal appearance, normal palate and supple neck   Eyes:   sclerae white, pupils equal and reactive, red reflex normal bilaterally   Ears:   normal bilaterally   Mouth:   No perioral or gingival cyanosis or lesions. Tongue is normal in appearance.   Overriding upper this age are quick to say \"No!\" and slow to do what is asked. Your child is learning how to make decisions and how far he or she can push limits. This same bossy child may be quick to climb up in your lap with a favorite stuffed animal. Give your child kindness and love. It will pay off soon. At 18 months, your child may be ready to throw balls and walk quickly or run. He or she may say several words, listen to stories, and look at pictures. Your child may know how to use a spoon and cup. Follow-up care is a key part of your child's treatment and safety. Be sure to make and go to all appointments, and call your doctor if your child is having problems. It's also a good idea to know your child's test results and keep a list of the medicines your child takes. How can you care for your child at home? Safety  · Help prevent your child from choking by offering the right kinds of foods and watching out for choking hazards. · Watch your child at all times near the street or in a parking lot. Drivers may not be able to see small children. Know where your child is and check carefully before backing your car out of the driveway. · Watch your child at all times when he or she is near water, including pools, hot tubs, buckets, bathtubs, and toilets. · For every ride in a car, secure your child into a properly installed car seat that meets all current safety standards. For questions about car seats, call the [employer] at 1-668.589.6436. · Make sure your child cannot get burned. Keep hot pots, curling irons, irons, and coffee cups out of his or her reach. Put plastic plugs in all electrical sockets. Put in smoke detectors and check the batteries regularly. · Put locks or guards on all windows above the first floor. Watch your child at all times near play equipment and stairs. If your child is climbing out of his or her crib, change to a toddler bed.   · Keep cleaning products and medicines in locked cabinets out of your child's reach. Keep the number for Poison Control (9-322.362.3201) near your phone. · Tell your doctor if your child spends a lot of time in a house built before 1978. The paint could have lead in it, which can be harmful. Discipline  · Teach your child good behavior. Catch your child being good and respond to that behavior. · Use your body language, such as looking sad, to let your child know you do not like his or her behavior. A child this age [de-identified] misbehave 27 times a day. · Do not spank your child. · If you are having problems with discipline, talk to your doctor to find out what you can do to help your child. Feeding  · Offer a variety of healthy foods each day, including fruits, well-cooked vegetables, low-sugar cereal, yogurt, whole-grain breads and crackers, lean meat, fish, and tofu. Kids need to eat at least every 3 or 4 hours. · Do not give your child foods that may cause choking, such as nuts, whole grapes, hard or sticky candy, or popcorn. · Give your child healthy snacks. Even if your child does not seem to like them at first, keep trying. Buy snack foods made from wheat, corn, rice, oats, or other grains, such as breads, cereals, tortillas, noodles, crackers, and muffins. Immunizations  · Make sure your baby gets all the recommended childhood vaccines. They will help keep your baby healthy and prevent the spread of disease. When should you call for help? Watch closely for changes in your child's health, and be sure to contact your doctor if:  · You are concerned that your child is not growing or developing normally. · You are worried about your child's behavior. · You need more information about how to care for your child, or you have questions or concerns. Where can you learn more? Go to [email or web address] org and sign in to your [employer] account.  Enter C988 in the Smeet box to learn more about Child's Well Visit, 18 Months: Care Instructions.     If you do not have an account, please click on the Sign Up Now link. © 9711-9519 Healthwise, Incorporated. Care instructions adapted under license by [facility] ([facility]). This care instruction is for use with your licensed healthcare professional. If you have questions about a medical condition or this instruction, always ask your healthcare professional. [address] any warranty or liability for your use of this information.   Content Version: 89.5.858310; Current as of: September 9, 2014 No

## 2021-03-21 NOTE — ED ADULT TRIAGE NOTE - CHIEF COMPLAINT QUOTE
pt 23 weeks pregnant c/o fever x 2 days, max temp 102, took tylenol today at approximately 4pm; cough, nasal congestion and upper back pain. pt states she feels light contractions every 30 minutes or so. Denies SOB/CP.

## 2021-03-21 NOTE — ED ADULT NURSE NOTE - TEMPLATE
Sinusitis (Antibiotic Treatment)    The sinuses are air-filled spaces within the bones of the face. They connect to the inside of the nose. Sinusitis is an inflammation of the tissue lining the sinus cavity. Sinus inflammation can occur during a cold. It can also be due to allergies to pollens and other particles in the air. Sinusitis can cause symptoms of sinus congestion and fullness. A sinus infection causes fever, headache and facial pain. There is often green or yellow drainage from the nose or into the back of the throat (post-nasal drip). You have been given antibiotics to treat this condition.  Home care:  · Take the full course of antibiotics as instructed. Do not stop taking them, even if you feel better.  · Drink plenty of water, hot tea, and other liquids. This may help thin mucus. It also may promote sinus drainage.  · Heat may help soothe painful areas of the face. Use a towel soaked in hot water. Or,  the shower and direct the hot spray onto your face. Using a vaporizer along with a menthol rub at night may also help.   · An expectorant containing guaifenesin may help thin the mucus and promote drainage from the sinuses.  · Over-the-counter decongestants may be used unless a similar medicine was prescribed. Nasal sprays work the fastest. Use one that contains phenylephrine or oxymetazoline. First blow the nose gently. Then use the spray. Do not use these medicines more often than directed on the label or symptoms may get worse. You may also use tablets containing pseudoephedrine. Avoid products that combine ingredients, because side effects may be increased. Read labels. You can also ask the pharmacist for help. (NOTE: Persons with high blood pressure should not use decongestants. They can raise blood pressure.)  · Over-the-counter antihistamines may help if allergies contributed to your sinusitis.    · Do not use nasal rinses or irrigation during an acute sinus infection, unless told to by  your health care provider. Rinsing may spread the infection to other sinuses.  · Use acetaminophen or ibuprofen to control pain, unless another pain medicine was prescribed. (If you have chronic liver or kidney disease or ever had a stomach ulcer, talk with your doctor before using these medicines. Aspirin should never be used in anyone under 18 years of age who is ill with a fever. It may cause severe liver damage.)  · Don't smoke. This can worsen symptoms.  Follow-up care  Follow up with your healthcare provider or our staff if you are not improving within the next week.  When to seek medical advice  Call your healthcare provider if any of these occur:  · Facial pain or headache becoming more severe  · Stiff neck  · Unusual drowsiness or confusion  · Swelling of the forehead or eyelids  · Vision problems, including blurred or double vision  · Fever of 100.4ºF (38ºC) or higher, or as directed by your healthcare provider  · Seizure  · Breathing problems  · Symptoms not resolving within 10 days  Date Last Reviewed: 4/13/2015  © 5663-3980 [employer]. [address] [place] [address], [place] [address]. All rights reserved. This information is not intended as a substitute for professional medical care. Always follow your healthcare professional's instructions.         General

## 2021-03-21 NOTE — OB PROVIDER TRIAGE NOTE - NSHPPHYSICALEXAM_GEN_ALL_CORE
Vital Signs Last 24 Hrs  T(C): 37.1 (21 Mar 2021 22:07), Max: 37.7 (21 Mar 2021 18:59)  T(F): 98.8 (21 Mar 2021 22:07), Max: 99.8 (21 Mar 2021 18:59)  HR: 96 (21 Mar 2021 23:06) (92 - 114)  BP: 96/62 (21 Mar 2021 23:06) (96/62 - 113/74)  RR: 18 (21 Mar 2021 22:07) (18 - 18)  SpO2: 99% (21 Mar 2021 23:03) (98% - 100%)    Gen: AOx3, NAD  Cardiac: RRR, S1S2, no m/r/g  Pulm: CTA b/l  Abd: soft, gravid, nontender, no palpable ctx  SVE: c/l/p  speculum: cervix appears closed, no abnormal discharge  TVUS: CL 3.85cm  BSS: MVP 6.85cm, cephalic, anterior placenta, FHR: 144 Vital Signs Last 24 Hrs  T(C): 37.1 (21 Mar 2021 22:07), Max: 37.7 (21 Mar 2021 18:59)  T(F): 98.8 (21 Mar 2021 22:07), Max: 99.8 (21 Mar 2021 18:59)  HR: 96 (21 Mar 2021 23:06) (92 - 114)  BP: 96/62 (21 Mar 2021 23:06) (96/62 - 113/74)  RR: 18 (21 Mar 2021 22:07) (18 - 18)  SpO2: 99% (21 Mar 2021 23:03) (98% - 100%)    Denies the following: constitutional symptoms, visual symptoms, cardiovascular symptoms, GI symptoms, musculoskeletal symptoms, skin symptoms, neurologic symptoms, hematologic symptoms, allergic symptoms, or psychiatric symptoms, except any pertinent positives listed.      Gen: AOx3, NAD  Cardiac: RRR, S1S2, no m/r/g  Pulm: CTA b/l  Abd: soft, gravid, nontender, no palpable ctx  SVE: c/l/p  speculum: cervix appears closed, no abnormal discharge  TVUS: CL 3.85cm  BSS: MVP 6.85cm, cephalic, anterior placenta, FHR: 144

## 2021-03-21 NOTE — OB PROVIDER TRIAGE NOTE - NSOBPROVIDERNOTE_OBGYN_ALL_OB_FT
31yo  @19w6d, COVID pos, not in labor, currently clinically and hemodynamically stable.    -Hydration and ambulation encouraged  -Covid precautions given  -Labor precautions given  -f/u at next scheduled visit    Dr. Ren and Dr. Hull aware 31yo  @19w6d, COVID pos, not in labor, currently clinically and hemodynamically stable.    -Hydration and ambulation encouraged  -Covid precautions given  -Labor precautions given  -f/u at next scheduled visit    Dr. Ren and Dr. Hull aware    I was physically present for the key portions of the evaluation and management (e/m) service provided. I agree with the above history,physical and plan which I have reviewed and edited where appropriate  Patient seen face to face and case reviewed, precautions given to patient  sono + fh, cervical length >3cm

## 2021-03-21 NOTE — OB RN TRIAGE NOTE - INTERNATIONAL TRAVEL
There is an order in the system for Ashwin to schedule a Stress Echo, but the order states the stress method is Regadenoson. When possible, can you please review and confirm which stress test is to be scheduled (Nuclear Med, Regadenoson or Stress Echo)? If a Stress Echo was the intended test, could you please enter new orders? Thank you.    No

## 2021-03-21 NOTE — ED PROVIDER NOTE - ATTENDING CONTRIBUTION TO CARE
I personally evaluated the patient. I reviewed the Resident’s or Physician Assistant’s note (as assigned above), and agree with the findings and plan except as documented in my note.    PT is a 29 y/o female 23 weeks pregnant presents for cough, body aches, fever for several days, mild, constant, worse since onset. + abd pain with coughing. No vaginal bleeding.     Constitutional: Well developed, well nourished. NAD.  Head: Normocephalic, atraumatic.  Eyes: PERRL. EOMI.  ENT: No nasal discharge. Mucous membranes moist.  Neck: Supple. Painless ROM.  Cardiovascular: Normal S1, S2. Regular rate and rhythm. No murmurs, rubs, or gallops.  Pulmonary: Normal respiratory rate and effort. Lungs clear to auscultation bilaterally. No wheezing, rales, or rhonchi.  Abdominal: Soft. Fundus above umbilicus. Nontender. No rebound, guarding, rigidity.  Extremities. Pelvis stable. No lower extremity edema, symmetric calves.  Skin: No rashes, cyanosis.  Neuro: AAOx3. No focal neurological deficits.  Psych: Normal mood. Normal affect.

## 2021-03-21 NOTE — ED PROVIDER NOTE - CLINICAL SUMMARY MEDICAL DECISION MAKING FREE TEXT BOX
Pt 23 weeks pregnancy presented to ED with viral symptoms. FHR wnl. LAbs, imaging obtained. PT COVID positive. VSS. Discussed with OB, will send to L&D for monitoring.

## 2021-03-21 NOTE — ED PROVIDER NOTE - PHYSICAL EXAMINATION
CONST: Well appearing in NAD  EYES: PERRL, EOMI, Sclera and conjunctiva clear.   ENT: No nasal discharge. Oropharynx normal appearing, no erythema or exudates. No abscess or swelling. Uvula midline.   NECK: Non-tender, no meningeal signs. normal ROM. supple   CARD: Normal S1 S2; Normal rate and rhythm  RESP: Equal BS B/L, No wheezes, rhonchi or rales. No distress  GI: Soft, non-tender, non-distended. no cva tenderness. normal BS  MS: Normal ROM in all extremities. No midline spinal tenderness. pulses 2 +. no calf tenderness or swelling  SKIN: Warm, dry, no acute rashes. Good turgor  NEURO: A&Ox3, No focal deficits.

## 2021-03-21 NOTE — OB PROVIDER TRIAGE NOTE - NS_OBGYNHISTORY_OBGYN_ALL_OB_FT
#1 - 2009   #2 - 2017  #3- 2019    SAB #1-  w/ D&C  SAB #2-  w/ D&C  SAB #3-  w/ D&C    Biggest baby= 8 lb 12 oz    GynHx: denies h/o ovarian cysts, fibroids, abnormal pap smears, STIs

## 2021-03-21 NOTE — ED ADULT NURSE NOTE - OBJECTIVE STATEMENT
Pt 23 weeks pregnant c/o contractions, fever, cough, and back pain. Contractions do not get better when walking or drinking water. Pt afebrile at this time. + covid. Pt denies cp.

## 2021-03-27 LAB
CULTURE RESULTS: SIGNIFICANT CHANGE UP
CULTURE RESULTS: SIGNIFICANT CHANGE UP
SPECIMEN SOURCE: SIGNIFICANT CHANGE UP
SPECIMEN SOURCE: SIGNIFICANT CHANGE UP

## 2021-04-07 ENCOUNTER — NON-APPOINTMENT (OUTPATIENT)
Age: 31
End: 2021-04-07

## 2021-04-07 ENCOUNTER — OUTPATIENT (OUTPATIENT)
Dept: OUTPATIENT SERVICES | Facility: HOSPITAL | Age: 31
LOS: 1 days | Discharge: HOME | End: 2021-04-07

## 2021-04-07 ENCOUNTER — APPOINTMENT (OUTPATIENT)
Dept: OBGYN | Facility: CLINIC | Age: 31
End: 2021-04-07
Payer: MEDICAID

## 2021-04-07 VITALS
BODY MASS INDEX: 33.61 KG/M2 | WEIGHT: 178 LBS | DIASTOLIC BLOOD PRESSURE: 60 MMHG | SYSTOLIC BLOOD PRESSURE: 100 MMHG | HEIGHT: 61 IN

## 2021-04-07 DIAGNOSIS — Z98.890 OTHER SPECIFIED POSTPROCEDURAL STATES: Chronic | ICD-10-CM

## 2021-04-07 PROCEDURE — 99213 OFFICE O/P EST LOW 20 MIN: CPT

## 2021-04-09 LAB
ABO + RH PNL BLD: NORMAL
BASOPHILS # BLD AUTO: 0.04 K/UL
BASOPHILS NFR BLD AUTO: 0.5 %
BLD GP AB SCN SERPL QL: NORMAL
EOSINOPHIL # BLD AUTO: 0.08 K/UL
EOSINOPHIL NFR BLD AUTO: 0.9 %
HCT VFR BLD CALC: 36.1 %
HGB BLD-MCNC: 12.5 G/DL
HIV1+2 AB SPEC QL IA.RAPID: NONREACTIVE
IMM GRANULOCYTES NFR BLD AUTO: 0.7 %
LYMPHOCYTES # BLD AUTO: 1.61 K/UL
LYMPHOCYTES NFR BLD AUTO: 18.9 %
MAN DIFF?: NORMAL
MCHC RBC-ENTMCNC: 33 PG
MCHC RBC-ENTMCNC: 34.6 G/DL
MCV RBC AUTO: 95.3 FL
MONOCYTES # BLD AUTO: 0.5 K/UL
MONOCYTES NFR BLD AUTO: 5.9 %
NEUTROPHILS # BLD AUTO: 6.23 K/UL
NEUTROPHILS NFR BLD AUTO: 73.1 %
PLATELET # BLD AUTO: 475 K/UL
RBC # BLD: 3.79 M/UL
RBC # FLD: 13.2 %
WBC # FLD AUTO: 8.52 K/UL

## 2021-04-12 LAB
BACTERIA UR CULT: NORMAL
HBV SURFACE AG SER QL: NONREACTIVE
HGB A MFR BLD: 97.5 %
HGB A2 MFR BLD: 2.5 %
HGB FRACT BLD-IMP: NORMAL
LEAD BLD-MCNC: <1 UG/DL
MEV IGG FLD QL IA: <5 AU/ML
MEV IGG+IGM SER-IMP: NEGATIVE
RUBV IGG FLD-ACNC: 7.4 INDEX
RUBV IGG SER-IMP: POSITIVE
T PALLIDUM AB SER QL IA: NEGATIVE
VZV AB TITR SER: POSITIVE
VZV IGG SER IF-ACNC: 205.7 INDEX

## 2021-04-15 ENCOUNTER — OUTPATIENT (OUTPATIENT)
Dept: OUTPATIENT SERVICES | Facility: HOSPITAL | Age: 31
LOS: 1 days | Discharge: HOME | End: 2021-04-15

## 2021-04-15 ENCOUNTER — APPOINTMENT (OUTPATIENT)
Dept: ANTEPARTUM | Facility: CLINIC | Age: 31
End: 2021-04-15
Payer: MEDICAID

## 2021-04-15 ENCOUNTER — ASOB RESULT (OUTPATIENT)
Age: 31
End: 2021-04-15

## 2021-04-15 DIAGNOSIS — Z98.890 OTHER SPECIFIED POSTPROCEDURAL STATES: Chronic | ICD-10-CM

## 2021-04-15 PROCEDURE — 76811 OB US DETAILED SNGL FETUS: CPT | Mod: 26

## 2021-04-15 PROCEDURE — 76817 TRANSVAGINAL US OBSTETRIC: CPT | Mod: 26

## 2021-04-20 LAB
AR GENE MUT ANL BLD/T: NORMAL
CFTR MUT TESTED BLD/T: NEGATIVE

## 2021-05-05 ENCOUNTER — NON-APPOINTMENT (OUTPATIENT)
Age: 31
End: 2021-05-05

## 2021-05-05 ENCOUNTER — OUTPATIENT (OUTPATIENT)
Dept: OUTPATIENT SERVICES | Facility: HOSPITAL | Age: 31
LOS: 1 days | Discharge: HOME | End: 2021-05-05

## 2021-05-05 ENCOUNTER — APPOINTMENT (OUTPATIENT)
Dept: OBGYN | Facility: CLINIC | Age: 31
End: 2021-05-05
Payer: MEDICAID

## 2021-05-05 ENCOUNTER — RESULT CHARGE (OUTPATIENT)
Age: 31
End: 2021-05-05

## 2021-05-05 VITALS
HEIGHT: 61 IN | SYSTOLIC BLOOD PRESSURE: 100 MMHG | WEIGHT: 181 LBS | DIASTOLIC BLOOD PRESSURE: 60 MMHG | BODY MASS INDEX: 34.17 KG/M2

## 2021-05-05 DIAGNOSIS — Z34.90 ENCOUNTER FOR SUPERVISION OF NORMAL PREGNANCY, UNSPECIFIED, UNSPECIFIED TRIMESTER: ICD-10-CM

## 2021-05-05 DIAGNOSIS — Z98.890 OTHER SPECIFIED POSTPROCEDURAL STATES: Chronic | ICD-10-CM

## 2021-05-05 PROCEDURE — 99213 OFFICE O/P EST LOW 20 MIN: CPT

## 2021-05-28 ENCOUNTER — NON-APPOINTMENT (OUTPATIENT)
Age: 31
End: 2021-05-28

## 2021-06-02 ENCOUNTER — NON-APPOINTMENT (OUTPATIENT)
Age: 31
End: 2021-06-02

## 2021-06-02 ENCOUNTER — OUTPATIENT (OUTPATIENT)
Dept: OUTPATIENT SERVICES | Facility: HOSPITAL | Age: 31
LOS: 1 days | Discharge: HOME | End: 2021-06-02

## 2021-06-02 ENCOUNTER — RESULT CHARGE (OUTPATIENT)
Age: 31
End: 2021-06-02

## 2021-06-02 ENCOUNTER — APPOINTMENT (OUTPATIENT)
Dept: OBGYN | Facility: CLINIC | Age: 31
End: 2021-06-02
Payer: MEDICAID

## 2021-06-02 VITALS
HEIGHT: 61 IN | WEIGHT: 183 LBS | SYSTOLIC BLOOD PRESSURE: 98 MMHG | DIASTOLIC BLOOD PRESSURE: 62 MMHG | BODY MASS INDEX: 34.55 KG/M2

## 2021-06-02 DIAGNOSIS — Z98.890 OTHER SPECIFIED POSTPROCEDURAL STATES: Chronic | ICD-10-CM

## 2021-06-02 PROCEDURE — 99213 OFFICE O/P EST LOW 20 MIN: CPT

## 2021-06-14 ENCOUNTER — NON-APPOINTMENT (OUTPATIENT)
Age: 31
End: 2021-06-14

## 2021-06-16 ENCOUNTER — NON-APPOINTMENT (OUTPATIENT)
Age: 31
End: 2021-06-16

## 2021-06-16 ENCOUNTER — APPOINTMENT (OUTPATIENT)
Dept: ANTEPARTUM | Facility: CLINIC | Age: 31
End: 2021-06-16

## 2021-06-16 ENCOUNTER — APPOINTMENT (OUTPATIENT)
Dept: OBGYN | Facility: CLINIC | Age: 31
End: 2021-06-16
Payer: MEDICAID

## 2021-06-16 ENCOUNTER — OUTPATIENT (OUTPATIENT)
Dept: OUTPATIENT SERVICES | Facility: HOSPITAL | Age: 31
LOS: 1 days | Discharge: HOME | End: 2021-06-16

## 2021-06-16 ENCOUNTER — RESULT CHARGE (OUTPATIENT)
Age: 31
End: 2021-06-16

## 2021-06-16 VITALS
BODY MASS INDEX: 34.17 KG/M2 | HEIGHT: 61 IN | WEIGHT: 181 LBS | DIASTOLIC BLOOD PRESSURE: 60 MMHG | SYSTOLIC BLOOD PRESSURE: 100 MMHG

## 2021-06-16 DIAGNOSIS — Z98.890 OTHER SPECIFIED POSTPROCEDURAL STATES: Chronic | ICD-10-CM

## 2021-06-16 PROCEDURE — 99213 OFFICE O/P EST LOW 20 MIN: CPT

## 2021-06-23 ENCOUNTER — APPOINTMENT (OUTPATIENT)
Dept: ANTEPARTUM | Facility: CLINIC | Age: 31
End: 2021-06-23

## 2021-06-30 ENCOUNTER — APPOINTMENT (OUTPATIENT)
Dept: ANTEPARTUM | Facility: CLINIC | Age: 31
End: 2021-06-30
Payer: MEDICAID

## 2021-06-30 ENCOUNTER — NON-APPOINTMENT (OUTPATIENT)
Age: 31
End: 2021-06-30

## 2021-06-30 ENCOUNTER — ASOB RESULT (OUTPATIENT)
Age: 31
End: 2021-06-30

## 2021-06-30 ENCOUNTER — RESULT CHARGE (OUTPATIENT)
Age: 31
End: 2021-06-30

## 2021-06-30 ENCOUNTER — OUTPATIENT (OUTPATIENT)
Dept: OUTPATIENT SERVICES | Facility: HOSPITAL | Age: 31
LOS: 1 days | Discharge: HOME | End: 2021-06-30

## 2021-06-30 ENCOUNTER — APPOINTMENT (OUTPATIENT)
Dept: OBGYN | Facility: CLINIC | Age: 31
End: 2021-06-30
Payer: MEDICAID

## 2021-06-30 VITALS
DIASTOLIC BLOOD PRESSURE: 60 MMHG | WEIGHT: 181.02 LBS | SYSTOLIC BLOOD PRESSURE: 100 MMHG | HEIGHT: 61 IN | BODY MASS INDEX: 34.18 KG/M2

## 2021-06-30 DIAGNOSIS — Z30.430 ENCOUNTER FOR INSERTION OF INTRAUTERINE CONTRACEPTIVE DEVICE: ICD-10-CM

## 2021-06-30 DIAGNOSIS — O26.849 UTERINE SIZE-DATE DISCREPANCY, UNSPECIFIED TRIMESTER: ICD-10-CM

## 2021-06-30 DIAGNOSIS — O99.210 OBESITY COMPLICATING PREGNANCY, UNSPECIFIED TRIMESTER: ICD-10-CM

## 2021-06-30 DIAGNOSIS — Z98.890 OTHER SPECIFIED POSTPROCEDURAL STATES: Chronic | ICD-10-CM

## 2021-06-30 DIAGNOSIS — Z97.5 PRESENCE OF (INTRAUTERINE) CONTRACEPTIVE DEVICE: ICD-10-CM

## 2021-06-30 DIAGNOSIS — Z3A.36 36 WEEKS GESTATION OF PREGNANCY: ICD-10-CM

## 2021-06-30 DIAGNOSIS — Z36.89 ENCOUNTER FOR OTHER SPECIFIED ANTENATAL SCREENING: ICD-10-CM

## 2021-06-30 DIAGNOSIS — Z30.432 ENCOUNTER FOR REMOVAL OF INTRAUTERINE CONTRACEPTIVE DEVICE: ICD-10-CM

## 2021-06-30 PROCEDURE — 76816 OB US FOLLOW-UP PER FETUS: CPT | Mod: 26

## 2021-06-30 PROCEDURE — 76819 FETAL BIOPHYS PROFIL W/O NST: CPT | Mod: 26

## 2021-06-30 PROCEDURE — 99213 OFFICE O/P EST LOW 20 MIN: CPT

## 2021-07-07 ENCOUNTER — APPOINTMENT (OUTPATIENT)
Dept: ANTEPARTUM | Facility: CLINIC | Age: 31
End: 2021-07-07

## 2021-07-14 ENCOUNTER — NON-APPOINTMENT (OUTPATIENT)
Age: 31
End: 2021-07-14

## 2021-07-14 ENCOUNTER — APPOINTMENT (OUTPATIENT)
Dept: ANTEPARTUM | Facility: CLINIC | Age: 31
End: 2021-07-14

## 2021-07-14 ENCOUNTER — OUTPATIENT (OUTPATIENT)
Dept: OUTPATIENT SERVICES | Facility: HOSPITAL | Age: 31
LOS: 1 days | Discharge: HOME | End: 2021-07-14

## 2021-07-14 ENCOUNTER — RESULT CHARGE (OUTPATIENT)
Age: 31
End: 2021-07-14

## 2021-07-14 ENCOUNTER — APPOINTMENT (OUTPATIENT)
Dept: OBGYN | Facility: CLINIC | Age: 31
End: 2021-07-14
Payer: MEDICAID

## 2021-07-14 VITALS
HEIGHT: 61 IN | SYSTOLIC BLOOD PRESSURE: 100 MMHG | BODY MASS INDEX: 34.74 KG/M2 | DIASTOLIC BLOOD PRESSURE: 60 MMHG | WEIGHT: 184 LBS

## 2021-07-14 DIAGNOSIS — Z98.890 OTHER SPECIFIED POSTPROCEDURAL STATES: Chronic | ICD-10-CM

## 2021-07-14 LAB
BASOPHILS # BLD AUTO: 0.05 K/UL
BASOPHILS NFR BLD AUTO: 0.6 %
BILIRUB UR QL STRIP: NORMAL
CLARITY UR: CLEAR
COLLECTION METHOD: NORMAL
EOSINOPHIL # BLD AUTO: 0.17 K/UL
EOSINOPHIL NFR BLD AUTO: 1.9 %
GLUCOSE 1H P 50 G GLC PO SERPL-MCNC: 162 MG/DL
GLUCOSE UR-MCNC: NORMAL
HCG UR QL: 0.2 EU/DL
HCT VFR BLD CALC: 37.5 %
HGB BLD-MCNC: 12.4 G/DL
HGB UR QL STRIP.AUTO: NORMAL
IMM GRANULOCYTES NFR BLD AUTO: 0.8 %
KETONES UR-MCNC: NORMAL
LEUKOCYTE ESTERASE UR QL STRIP: NORMAL
LYMPHOCYTES # BLD AUTO: 1.62 K/UL
LYMPHOCYTES NFR BLD AUTO: 18 %
MAN DIFF?: NORMAL
MCHC RBC-ENTMCNC: 31.1 PG
MCHC RBC-ENTMCNC: 33.1 G/DL
MCV RBC AUTO: 94 FL
MONOCYTES # BLD AUTO: 0.47 K/UL
MONOCYTES NFR BLD AUTO: 5.2 %
NEUTROPHILS # BLD AUTO: 6.61 K/UL
NEUTROPHILS NFR BLD AUTO: 73.5 %
NITRITE UR QL STRIP: NORMAL
PH UR STRIP: 6
PLATELET # BLD AUTO: 259 K/UL
PROT UR STRIP-MCNC: NORMAL
RBC # BLD: 3.99 M/UL
RBC # FLD: 13.8 %
SP GR UR STRIP: 1.01
WBC # FLD AUTO: 8.99 K/UL

## 2021-07-14 PROCEDURE — 99213 OFFICE O/P EST LOW 20 MIN: CPT

## 2021-07-16 LAB
C TRACH RRNA SPEC QL NAA+PROBE: NOT DETECTED
GLUCOSE 1H P 100 G GLC PO SERPL-MCNC: 188 MG/DL
GLUCOSE 2H P CHAL SERPL-MCNC: 125 MG/DL
GLUCOSE 3H P CHAL SERPL-MCNC: 111 MG/DL
GLUCOSE BS SERPL-MCNC: 92 MG/DL
N GONORRHOEA RRNA SPEC QL NAA+PROBE: NOT DETECTED
SOURCE AMPLIFICATION: NORMAL

## 2021-07-20 LAB
GP B STREP DNA SPEC QL NAA+PROBE: DETECTED
GP B STREP DNA SPEC QL NAA+PROBE: NORMAL
SOURCE GBS: NORMAL

## 2021-07-21 ENCOUNTER — NON-APPOINTMENT (OUTPATIENT)
Age: 31
End: 2021-07-21

## 2021-07-21 ENCOUNTER — APPOINTMENT (OUTPATIENT)
Dept: ANTEPARTUM | Facility: CLINIC | Age: 31
End: 2021-07-21

## 2021-07-21 ENCOUNTER — APPOINTMENT (OUTPATIENT)
Dept: OBGYN | Facility: CLINIC | Age: 31
End: 2021-07-21
Payer: MEDICAID

## 2021-07-21 ENCOUNTER — OUTPATIENT (OUTPATIENT)
Dept: OUTPATIENT SERVICES | Facility: HOSPITAL | Age: 31
LOS: 1 days | Discharge: HOME | End: 2021-07-21

## 2021-07-21 ENCOUNTER — RESULT CHARGE (OUTPATIENT)
Age: 31
End: 2021-07-21

## 2021-07-21 VITALS
WEIGHT: 187 LBS | SYSTOLIC BLOOD PRESSURE: 100 MMHG | DIASTOLIC BLOOD PRESSURE: 70 MMHG | HEIGHT: 61 IN | BODY MASS INDEX: 35.3 KG/M2

## 2021-07-21 DIAGNOSIS — Z98.890 OTHER SPECIFIED POSTPROCEDURAL STATES: Chronic | ICD-10-CM

## 2021-07-21 PROCEDURE — 99213 OFFICE O/P EST LOW 20 MIN: CPT

## 2021-07-23 LAB
BILIRUB UR QL STRIP: NORMAL
CLARITY UR: CLEAR
COLLECTION METHOD: NORMAL
GLUCOSE UR-MCNC: NORMAL
HCG UR QL: 0.2 EU/DL
HGB UR QL STRIP.AUTO: NORMAL
KETONES UR-MCNC: NORMAL
LEUKOCYTE ESTERASE UR QL STRIP: NORMAL
NITRITE UR QL STRIP: NORMAL
PH UR STRIP: 7
PROT UR STRIP-MCNC: NORMAL
SP GR UR STRIP: 1

## 2021-07-28 ENCOUNTER — NON-APPOINTMENT (OUTPATIENT)
Age: 31
End: 2021-07-28

## 2021-07-28 ENCOUNTER — APPOINTMENT (OUTPATIENT)
Dept: OBGYN | Facility: CLINIC | Age: 31
End: 2021-07-28
Payer: MEDICAID

## 2021-07-28 ENCOUNTER — APPOINTMENT (OUTPATIENT)
Dept: ANTEPARTUM | Facility: CLINIC | Age: 31
End: 2021-07-28

## 2021-07-28 ENCOUNTER — OUTPATIENT (OUTPATIENT)
Dept: OUTPATIENT SERVICES | Facility: HOSPITAL | Age: 31
LOS: 1 days | Discharge: HOME | End: 2021-07-28

## 2021-07-28 ENCOUNTER — RESULT CHARGE (OUTPATIENT)
Age: 31
End: 2021-07-28

## 2021-07-28 VITALS
DIASTOLIC BLOOD PRESSURE: 68 MMHG | HEIGHT: 61 IN | BODY MASS INDEX: 34.93 KG/M2 | SYSTOLIC BLOOD PRESSURE: 90 MMHG | WEIGHT: 185 LBS

## 2021-07-28 DIAGNOSIS — Z98.890 OTHER SPECIFIED POSTPROCEDURAL STATES: Chronic | ICD-10-CM

## 2021-07-28 PROCEDURE — 99213 OFFICE O/P EST LOW 20 MIN: CPT

## 2021-08-03 ENCOUNTER — NON-APPOINTMENT (OUTPATIENT)
Age: 31
End: 2021-08-03

## 2021-08-04 ENCOUNTER — APPOINTMENT (OUTPATIENT)
Dept: OBGYN | Facility: CLINIC | Age: 31
End: 2021-08-04
Payer: MEDICAID

## 2021-08-04 ENCOUNTER — APPOINTMENT (OUTPATIENT)
Dept: ANTEPARTUM | Facility: CLINIC | Age: 31
End: 2021-08-04

## 2021-08-04 ENCOUNTER — OUTPATIENT (OUTPATIENT)
Dept: OUTPATIENT SERVICES | Facility: HOSPITAL | Age: 31
LOS: 1 days | Discharge: HOME | End: 2021-08-04

## 2021-08-04 ENCOUNTER — RESULT CHARGE (OUTPATIENT)
Age: 31
End: 2021-08-04

## 2021-08-04 VITALS
DIASTOLIC BLOOD PRESSURE: 70 MMHG | BODY MASS INDEX: 35.3 KG/M2 | HEIGHT: 61 IN | SYSTOLIC BLOOD PRESSURE: 98 MMHG | WEIGHT: 187 LBS

## 2021-08-04 DIAGNOSIS — Z98.890 OTHER SPECIFIED POSTPROCEDURAL STATES: Chronic | ICD-10-CM

## 2021-08-04 LAB
BILIRUB UR QL STRIP: NORMAL
CLARITY UR: CLEAR
COLLECTION METHOD: NORMAL
GLUCOSE UR-MCNC: NORMAL
HCG UR QL: 0.2 EU/DL
HGB UR QL STRIP.AUTO: NORMAL
KETONES UR-MCNC: NORMAL
LEUKOCYTE ESTERASE UR QL STRIP: NORMAL
NITRITE UR QL STRIP: NORMAL
PH UR STRIP: 6.5
PROT UR STRIP-MCNC: NORMAL
SP GR UR STRIP: 1

## 2021-08-04 PROCEDURE — 99213 OFFICE O/P EST LOW 20 MIN: CPT

## 2021-08-05 ENCOUNTER — TRANSCRIPTION ENCOUNTER (OUTPATIENT)
Age: 31
End: 2021-08-05

## 2021-08-05 ENCOUNTER — INPATIENT (INPATIENT)
Facility: HOSPITAL | Age: 31
LOS: 0 days | Discharge: HOME | End: 2021-08-06
Attending: OBSTETRICS & GYNECOLOGY | Admitting: OBSTETRICS & GYNECOLOGY
Payer: MEDICAID

## 2021-08-05 VITALS
HEART RATE: 95 BPM | SYSTOLIC BLOOD PRESSURE: 107 MMHG | DIASTOLIC BLOOD PRESSURE: 71 MMHG | RESPIRATION RATE: 16 BRPM | WEIGHT: 186.07 LBS | HEIGHT: 62 IN | TEMPERATURE: 98 F

## 2021-08-05 DIAGNOSIS — Z98.890 OTHER SPECIFIED POSTPROCEDURAL STATES: Chronic | ICD-10-CM

## 2021-08-05 LAB
AMPHET UR-MCNC: NEGATIVE — SIGNIFICANT CHANGE UP
APPEARANCE UR: CLEAR — SIGNIFICANT CHANGE UP
BACTERIA # UR AUTO: NEGATIVE — SIGNIFICANT CHANGE UP
BARBITURATES UR SCN-MCNC: NEGATIVE — SIGNIFICANT CHANGE UP
BASOPHILS # BLD AUTO: 0.04 K/UL — SIGNIFICANT CHANGE UP (ref 0–0.2)
BASOPHILS NFR BLD AUTO: 0.5 % — SIGNIFICANT CHANGE UP (ref 0–1)
BENZODIAZ UR-MCNC: NEGATIVE — SIGNIFICANT CHANGE UP
BILIRUB UR-MCNC: NEGATIVE — SIGNIFICANT CHANGE UP
BLD GP AB SCN SERPL QL: SIGNIFICANT CHANGE UP
BUPRENORPHINE SCREEN, URINE RESULT: NEGATIVE — SIGNIFICANT CHANGE UP
COCAINE METAB.OTHER UR-MCNC: NEGATIVE — SIGNIFICANT CHANGE UP
COLOR SPEC: SIGNIFICANT CHANGE UP
DIFF PNL FLD: NEGATIVE — SIGNIFICANT CHANGE UP
EOSINOPHIL # BLD AUTO: 0.06 K/UL — SIGNIFICANT CHANGE UP (ref 0–0.7)
EOSINOPHIL NFR BLD AUTO: 0.7 % — SIGNIFICANT CHANGE UP (ref 0–8)
EPI CELLS # UR: 4 /HPF — SIGNIFICANT CHANGE UP (ref 0–5)
FENTANYL UR QL: NEGATIVE — SIGNIFICANT CHANGE UP
GLUCOSE UR QL: NEGATIVE — SIGNIFICANT CHANGE UP
HCT VFR BLD CALC: 37.7 % — SIGNIFICANT CHANGE UP (ref 37–47)
HGB BLD-MCNC: 12.6 G/DL — SIGNIFICANT CHANGE UP (ref 12–16)
HIV 1 & 2 AB SERPL IA.RAPID: SIGNIFICANT CHANGE UP
HYALINE CASTS # UR AUTO: 0 /LPF — SIGNIFICANT CHANGE UP (ref 0–7)
IMM GRANULOCYTES NFR BLD AUTO: 0.6 % — HIGH (ref 0.1–0.3)
KETONES UR-MCNC: NEGATIVE — SIGNIFICANT CHANGE UP
L&D DRUG SCREEN, URINE: SIGNIFICANT CHANGE UP
LEUKOCYTE ESTERASE UR-ACNC: ABNORMAL
LYMPHOCYTES # BLD AUTO: 1.71 K/UL — SIGNIFICANT CHANGE UP (ref 1.2–3.4)
LYMPHOCYTES # BLD AUTO: 19.6 % — LOW (ref 20.5–51.1)
MCHC RBC-ENTMCNC: 30.3 PG — SIGNIFICANT CHANGE UP (ref 27–31)
MCHC RBC-ENTMCNC: 33.4 G/DL — SIGNIFICANT CHANGE UP (ref 32–37)
MCV RBC AUTO: 90.6 FL — SIGNIFICANT CHANGE UP (ref 81–99)
METHADONE UR-MCNC: NEGATIVE — SIGNIFICANT CHANGE UP
MONOCYTES # BLD AUTO: 0.47 K/UL — SIGNIFICANT CHANGE UP (ref 0.1–0.6)
MONOCYTES NFR BLD AUTO: 5.4 % — SIGNIFICANT CHANGE UP (ref 1.7–9.3)
NEUTROPHILS # BLD AUTO: 6.4 K/UL — SIGNIFICANT CHANGE UP (ref 1.4–6.5)
NEUTROPHILS NFR BLD AUTO: 73.2 % — SIGNIFICANT CHANGE UP (ref 42.2–75.2)
NITRITE UR-MCNC: NEGATIVE — SIGNIFICANT CHANGE UP
NRBC # BLD: 0 /100 WBCS — SIGNIFICANT CHANGE UP (ref 0–0)
OPIATES UR-MCNC: NEGATIVE — SIGNIFICANT CHANGE UP
OXYCODONE UR-MCNC: NEGATIVE — SIGNIFICANT CHANGE UP
PCP UR-MCNC: NEGATIVE — SIGNIFICANT CHANGE UP
PH UR: 6.5 — SIGNIFICANT CHANGE UP (ref 5–8)
PLATELET # BLD AUTO: 265 K/UL — SIGNIFICANT CHANGE UP (ref 130–400)
PRENATAL SYPHILIS TEST: SIGNIFICANT CHANGE UP
PROPOXYPHENE QUALITATIVE URINE RESULT: NEGATIVE — SIGNIFICANT CHANGE UP
PROT UR-MCNC: NEGATIVE — SIGNIFICANT CHANGE UP
RBC # BLD: 4.16 M/UL — LOW (ref 4.2–5.4)
RBC # FLD: 13.8 % — SIGNIFICANT CHANGE UP (ref 11.5–14.5)
RBC CASTS # UR COMP ASSIST: 1 /HPF — SIGNIFICANT CHANGE UP (ref 0–4)
SARS-COV-2 RNA SPEC QL NAA+PROBE: SIGNIFICANT CHANGE UP
SP GR SPEC: 1.01 — LOW (ref 1.01–1.03)
UROBILINOGEN FLD QL: SIGNIFICANT CHANGE UP
WBC # BLD: 8.73 K/UL — SIGNIFICANT CHANGE UP (ref 4.8–10.8)
WBC # FLD AUTO: 8.73 K/UL — SIGNIFICANT CHANGE UP (ref 4.8–10.8)
WBC UR QL: 5 /HPF — SIGNIFICANT CHANGE UP (ref 0–5)

## 2021-08-05 PROCEDURE — 59409 OBSTETRICAL CARE: CPT | Mod: U9

## 2021-08-05 RX ORDER — TETANUS TOXOID, REDUCED DIPHTHERIA TOXOID AND ACELLULAR PERTUSSIS VACCINE, ADSORBED 5; 2.5; 8; 8; 2.5 [IU]/.5ML; [IU]/.5ML; UG/.5ML; UG/.5ML; UG/.5ML
0.5 SUSPENSION INTRAMUSCULAR ONCE
Refills: 0 | Status: DISCONTINUED | OUTPATIENT
Start: 2021-08-05 | End: 2021-08-06

## 2021-08-05 RX ORDER — KETOROLAC TROMETHAMINE 30 MG/ML
30 SYRINGE (ML) INJECTION ONCE
Refills: 0 | Status: DISCONTINUED | OUTPATIENT
Start: 2021-08-05 | End: 2021-08-05

## 2021-08-05 RX ORDER — OXYTOCIN 10 UNIT/ML
2 VIAL (ML) INJECTION
Qty: 30 | Refills: 0 | Status: DISCONTINUED | OUTPATIENT
Start: 2021-08-05 | End: 2021-08-05

## 2021-08-05 RX ORDER — BENZOCAINE 10 %
1 GEL (GRAM) MUCOUS MEMBRANE EVERY 6 HOURS
Refills: 0 | Status: DISCONTINUED | OUTPATIENT
Start: 2021-08-05 | End: 2021-08-06

## 2021-08-05 RX ORDER — SIMETHICONE 80 MG/1
80 TABLET, CHEWABLE ORAL EVERY 4 HOURS
Refills: 0 | Status: DISCONTINUED | OUTPATIENT
Start: 2021-08-05 | End: 2021-08-06

## 2021-08-05 RX ORDER — MAGNESIUM HYDROXIDE 400 MG/1
30 TABLET, CHEWABLE ORAL
Refills: 0 | Status: DISCONTINUED | OUTPATIENT
Start: 2021-08-05 | End: 2021-08-06

## 2021-08-05 RX ORDER — SODIUM CHLORIDE 9 MG/ML
1000 INJECTION, SOLUTION INTRAVENOUS
Refills: 0 | Status: DISCONTINUED | OUTPATIENT
Start: 2021-08-05 | End: 2021-08-05

## 2021-08-05 RX ORDER — AER TRAVELER 0.5 G/1
1 SOLUTION RECTAL; TOPICAL EVERY 4 HOURS
Refills: 0 | Status: DISCONTINUED | OUTPATIENT
Start: 2021-08-05 | End: 2021-08-06

## 2021-08-05 RX ORDER — OXYTOCIN 10 UNIT/ML
333.33 VIAL (ML) INJECTION
Qty: 20 | Refills: 0 | Status: DISCONTINUED | OUTPATIENT
Start: 2021-08-05 | End: 2021-08-05

## 2021-08-05 RX ORDER — OXYTOCIN 10 UNIT/ML
333.33 VIAL (ML) INJECTION
Qty: 20 | Refills: 0 | Status: DISCONTINUED | OUTPATIENT
Start: 2021-08-05 | End: 2021-08-06

## 2021-08-05 RX ORDER — CITRIC ACID/SODIUM CITRATE 300-500 MG
15 SOLUTION, ORAL ORAL EVERY 6 HOURS
Refills: 0 | Status: DISCONTINUED | OUTPATIENT
Start: 2021-08-05 | End: 2021-08-05

## 2021-08-05 RX ORDER — AMPICILLIN TRIHYDRATE 250 MG
1 CAPSULE ORAL EVERY 4 HOURS
Refills: 0 | Status: DISCONTINUED | OUTPATIENT
Start: 2021-08-05 | End: 2021-08-05

## 2021-08-05 RX ORDER — PRAMOXINE HYDROCHLORIDE 150 MG/15G
1 AEROSOL, FOAM RECTAL EVERY 4 HOURS
Refills: 0 | Status: DISCONTINUED | OUTPATIENT
Start: 2021-08-05 | End: 2021-08-06

## 2021-08-05 RX ORDER — DIBUCAINE 1 %
1 OINTMENT (GRAM) RECTAL EVERY 6 HOURS
Refills: 0 | Status: DISCONTINUED | OUTPATIENT
Start: 2021-08-05 | End: 2021-08-06

## 2021-08-05 RX ORDER — HYDROCORTISONE 1 %
1 OINTMENT (GRAM) TOPICAL EVERY 6 HOURS
Refills: 0 | Status: DISCONTINUED | OUTPATIENT
Start: 2021-08-05 | End: 2021-08-06

## 2021-08-05 RX ORDER — IBUPROFEN 200 MG
600 TABLET ORAL EVERY 6 HOURS
Refills: 0 | Status: DISCONTINUED | OUTPATIENT
Start: 2021-08-05 | End: 2021-08-06

## 2021-08-05 RX ORDER — ACETAMINOPHEN 500 MG
975 TABLET ORAL
Refills: 0 | Status: DISCONTINUED | OUTPATIENT
Start: 2021-08-05 | End: 2021-08-06

## 2021-08-05 RX ORDER — AMPICILLIN TRIHYDRATE 250 MG
2 CAPSULE ORAL ONCE
Refills: 0 | Status: COMPLETED | OUTPATIENT
Start: 2021-08-05 | End: 2021-08-05

## 2021-08-05 RX ORDER — OXYCODONE HYDROCHLORIDE 5 MG/1
5 TABLET ORAL ONCE
Refills: 0 | Status: DISCONTINUED | OUTPATIENT
Start: 2021-08-05 | End: 2021-08-06

## 2021-08-05 RX ORDER — SODIUM CHLORIDE 9 MG/ML
3 INJECTION INTRAMUSCULAR; INTRAVENOUS; SUBCUTANEOUS EVERY 8 HOURS
Refills: 0 | Status: DISCONTINUED | OUTPATIENT
Start: 2021-08-05 | End: 2021-08-06

## 2021-08-05 RX ORDER — IBUPROFEN 200 MG
600 TABLET ORAL EVERY 6 HOURS
Refills: 0 | Status: COMPLETED | OUTPATIENT
Start: 2021-08-05 | End: 2022-07-04

## 2021-08-05 RX ORDER — OXYCODONE HYDROCHLORIDE 5 MG/1
5 TABLET ORAL
Refills: 0 | Status: DISCONTINUED | OUTPATIENT
Start: 2021-08-05 | End: 2021-08-06

## 2021-08-05 RX ORDER — LANOLIN
1 OINTMENT (GRAM) TOPICAL EVERY 6 HOURS
Refills: 0 | Status: DISCONTINUED | OUTPATIENT
Start: 2021-08-05 | End: 2021-08-06

## 2021-08-05 RX ORDER — DIPHENHYDRAMINE HCL 50 MG
25 CAPSULE ORAL EVERY 6 HOURS
Refills: 0 | Status: DISCONTINUED | OUTPATIENT
Start: 2021-08-05 | End: 2021-08-06

## 2021-08-05 RX ORDER — DIPHENHYDRAMINE HCL 50 MG
25 CAPSULE ORAL EVERY 6 HOURS
Refills: 0 | Status: COMPLETED | OUTPATIENT
Start: 2021-08-05 | End: 2022-07-04

## 2021-08-05 RX ADMIN — Medication 108 GRAM(S): at 12:45

## 2021-08-05 RX ADMIN — Medication 2 MILLIUNIT(S)/MIN: at 08:51

## 2021-08-05 RX ADMIN — Medication 30 MILLIGRAM(S): at 15:50

## 2021-08-05 RX ADMIN — Medication 216 GRAM(S): at 08:41

## 2021-08-05 NOTE — OB PROVIDER H&P - NS_OBGYNHISTORY_OBGYN_ALL_OB_FT
OB:   P1 2017 40w 7lb8oz Male  no complications  P2 2019 40w 8lb male  no complications  P3 2019 40w [id number] female  no complications  SABx3 D+Cx3    GYN: denies history of fibroids, ovarian cysts, abnormal papsmears, and STIs

## 2021-08-05 NOTE — OB PROVIDER H&P - ASSESSMENT
29 yo  at 39w3d, GBS pos, elevated GCT and 1/4 elevated values on GTT, IOL at term  -Admit to L+D  -Monitor EFM and TOCO   -IVF and labs  -Pain control PRN  -Clear liquid diet as tolerated  -Pitocin for labor induction  -COVID test  -3rd trimester HIV, admission labs  -Ampicillin for GBS ppx    Dr. Sylvester aware, Dr. Ren aware.

## 2021-08-05 NOTE — OB PROVIDER H&P - HISTORY OF PRESENT ILLNESS
31 yo  at 39w3d dated by first trimester US with JACINTA 21 presenting for IOL at term. She reports irregular contraction since yesterday. Denies LOF, VB. Endorses good FM. Patient had a elevated GCT, with 1/4 elevated values on GTT this pregnancy. No other complications this pregnancy. GBS pos.

## 2021-08-05 NOTE — OB PROVIDER DELIVERY SUMMARY - NSPROVIDERDELIVERYNOTE_OBGYN_ALL_OB_FT
Patient was fully dilated and pushed. Fetal head delivered OA, and restituted to LOT. The anterior and  posterior shoulders delivered, followed by the remaining body atraumatically. Delayed cord clamping was performed for 1 minute, and then was clamped and cut. Cord gases collected. The  was handed to mother for skin to skin. The placenta delivered spontaneously intact with 3v cord. Uterus massaged and firm with oxytocin given IV, patient stable. Cervix, vagina and perineum inspected. Repair of 1st degree laceration repaired in the usual fashion with 3-0 chromic.  live male, APGARS 9/9 delivered, weighing 3300g. EBL -200, hemostasis assured, uterus firm, patient in stable condition.    Dr. Ren at bedside.

## 2021-08-05 NOTE — OB PROVIDER H&P - NSHPPHYSICALEXAM_GEN_ALL_CORE
T(C): 36.7 (08-05-21 @ 08:19), Max: 36.7 (08-05-21 @ 08:19)  HR: 95 (08-05-21 @ 08:19) (95 - 95)  BP: 107/71 (08-05-21 @ 08:19) (107/71 - 107/71)  RR: 16 (08-05-21 @ 08:19) (16 - 16)  BMI (kg/m2): 34 (08-05-21 @ 08:40)    Gen: A+OX3. NAD  Abd: Soft, Nontender. Gravid.  FHR: 130BPM/mod jaylyn/accels pos  TOCO: irregular  SVE:  deferred    EFW by Leopolds: 3200

## 2021-08-05 NOTE — OB PROVIDER H&P - NSHPLABSRESULTS_GEN_ALL_CORE
7/15 GTT 92/188/125/111  7/13       Sonogram:   11w3d nuchal trans 0.87 mm, 151BPM, posterior placenta,   23w3d 149BPM, anterior placenta no previa, MVP 6.36,  39%, normal anatomy  34w2d 126BPM, vtx, anterior placenta, MVP 5.52, BPP 8/8, 2419 47%

## 2021-08-06 VITALS
RESPIRATION RATE: 18 BRPM | HEART RATE: 61 BPM | TEMPERATURE: 98 F | DIASTOLIC BLOOD PRESSURE: 61 MMHG | SYSTOLIC BLOOD PRESSURE: 105 MMHG

## 2021-08-06 LAB
BASOPHILS # BLD AUTO: 0.04 K/UL — SIGNIFICANT CHANGE UP (ref 0–0.2)
BASOPHILS NFR BLD AUTO: 0.4 % — SIGNIFICANT CHANGE UP (ref 0–1)
COVID-19 SPIKE DOMAIN AB INTERP: POSITIVE
COVID-19 SPIKE DOMAIN AB INTERP: POSITIVE
COVID-19 SPIKE DOMAIN ANTIBODY RESULT: >250 U/ML — HIGH
COVID-19 SPIKE DOMAIN ANTIBODY RESULT: >250 U/ML — HIGH
EOSINOPHIL # BLD AUTO: 0.12 K/UL — SIGNIFICANT CHANGE UP (ref 0–0.7)
EOSINOPHIL NFR BLD AUTO: 1.2 % — SIGNIFICANT CHANGE UP (ref 0–8)
HCT VFR BLD CALC: 36.5 % — LOW (ref 37–47)
HGB BLD-MCNC: 12 G/DL — SIGNIFICANT CHANGE UP (ref 12–16)
IMM GRANULOCYTES NFR BLD AUTO: 0.6 % — HIGH (ref 0.1–0.3)
LYMPHOCYTES # BLD AUTO: 1.83 K/UL — SIGNIFICANT CHANGE UP (ref 1.2–3.4)
LYMPHOCYTES # BLD AUTO: 18.8 % — LOW (ref 20.5–51.1)
MCHC RBC-ENTMCNC: 30.1 PG — SIGNIFICANT CHANGE UP (ref 27–31)
MCHC RBC-ENTMCNC: 32.9 G/DL — SIGNIFICANT CHANGE UP (ref 32–37)
MCV RBC AUTO: 91.5 FL — SIGNIFICANT CHANGE UP (ref 81–99)
MONOCYTES # BLD AUTO: 0.76 K/UL — HIGH (ref 0.1–0.6)
MONOCYTES NFR BLD AUTO: 7.8 % — SIGNIFICANT CHANGE UP (ref 1.7–9.3)
NEUTROPHILS # BLD AUTO: 6.94 K/UL — HIGH (ref 1.4–6.5)
NEUTROPHILS NFR BLD AUTO: 71.2 % — SIGNIFICANT CHANGE UP (ref 42.2–75.2)
NRBC # BLD: 0 /100 WBCS — SIGNIFICANT CHANGE UP (ref 0–0)
PLATELET # BLD AUTO: 249 K/UL — SIGNIFICANT CHANGE UP (ref 130–400)
RBC # BLD: 3.99 M/UL — LOW (ref 4.2–5.4)
RBC # FLD: 14 % — SIGNIFICANT CHANGE UP (ref 11.5–14.5)
SARS-COV-2 IGG+IGM SERPL QL IA: >250 U/ML — HIGH
SARS-COV-2 IGG+IGM SERPL QL IA: >250 U/ML — HIGH
SARS-COV-2 IGG+IGM SERPL QL IA: POSITIVE
SARS-COV-2 IGG+IGM SERPL QL IA: POSITIVE
WBC # BLD: 9.75 K/UL — SIGNIFICANT CHANGE UP (ref 4.8–10.8)
WBC # FLD AUTO: 9.75 K/UL — SIGNIFICANT CHANGE UP (ref 4.8–10.8)

## 2021-08-06 PROCEDURE — 99238 HOSP IP/OBS DSCHRG MGMT 30/<: CPT

## 2021-08-06 RX ORDER — DOCUSATE SODIUM 100 MG
1 CAPSULE ORAL
Qty: 14 | Refills: 0
Start: 2021-08-06 | End: 2021-08-12

## 2021-08-06 RX ORDER — IBUPROFEN 200 MG
1 TABLET ORAL
Qty: 28 | Refills: 0
Start: 2021-08-06 | End: 2021-08-12

## 2021-08-06 RX ADMIN — Medication 975 MILLIGRAM(S): at 15:47

## 2021-08-06 RX ADMIN — Medication 1 TABLET(S): at 12:11

## 2021-08-06 RX ADMIN — Medication 975 MILLIGRAM(S): at 16:15

## 2021-08-06 RX ADMIN — Medication 600 MILLIGRAM(S): at 12:11

## 2021-08-06 RX ADMIN — Medication 600 MILLIGRAM(S): at 18:06

## 2021-08-06 RX ADMIN — Medication 975 MILLIGRAM(S): at 03:40

## 2021-08-06 RX ADMIN — Medication 600 MILLIGRAM(S): at 17:36

## 2021-08-06 RX ADMIN — Medication 600 MILLIGRAM(S): at 12:41

## 2021-08-06 NOTE — PROGRESS NOTE ADULT - ASSESSMENT
A/P:  29yo P4 s/p , PPD#1, recovering well.    - encourage ambulation  - regular diet  - encourage PO hydration  - pain management prn   - f/u AM CBC  - discharge home today pending AM CBC, postpartum precautions discussed  - f/u in 6wks for postpartum visit    Dr. Ifeanyi lake.

## 2021-08-06 NOTE — DISCHARGE NOTE OB - CARE PROVIDER_API CALL
Roman Bunn)  Obstetrics and Gynecology  [address] [place] [address]  Phone: [phone]  Fax: [phone]  Follow Up Time: 1 month

## 2021-08-06 NOTE — DISCHARGE NOTE OB - MEDICATION SUMMARY - MEDICATIONS TO TAKE
I will START or STAY ON the medications listed below when I get home from the hospital:    ibuprofen 600 mg oral tablet  -- 1 tab(s) by mouth every 6 hours   -- Do not take this drug if you are pregnant.  It is very important that you take or use this exactly as directed.  Do not skip doses or discontinue unless directed by your doctor.  May cause drowsiness or dizziness.  Obtain medical advice before taking any non-prescription drugs as some may affect the action of this medication.  Take with food or milk.    -- Indication: For pain     Prenatal Multivitamins with Folic Acid 1 mg oral tablet  -- 1 tab(s) by mouth once a day  -- Indication: For breastfeeding    Colace 100 mg oral capsule  -- 1 cap(s) by mouth 2 times a day   -- Medication should be taken with plenty of water.    -- Indication: For constipation

## 2021-08-06 NOTE — DISCHARGE NOTE OB - PATIENT PORTAL LINK FT
You can access the FollowMyHealth Patient Portal offered by [facility] by registering at the following website: [email or web address]. By joining [facility]’s FollowMyHealth portal, you will also be able to view your health information using other applications (apps) compatible with our system.

## 2021-08-06 NOTE — DISCHARGE NOTE OB - PLAN OF CARE
No heavy lifting.   Nothing inside the vagina for 6 weeks: no tampons, douching, sexual intercourse, tub baths or pools.   If you have a fever over 100.4F, severe abdominal pain or heavy vaginal bleeding please call your doctor or go to the emergency room.  Please follow up with your OBGYN in 6 weeks for a postpartum visit. healthy and happy mom and baby

## 2021-08-06 NOTE — DISCHARGE NOTE OB - MEDICATION SUMMARY - MEDICATIONS TO STOP TAKING
I will STOP taking the medications listed below when I get home from the hospital:    ibuprofen 600 mg oral tablet  -- 1 tab(s) by mouth every 6 hours, As Needed    diphenhydrAMINE 25 mg oral capsule  -- 1 cap(s) by mouth every 6 hours, As needed, Pruritus    Prenatal Multivitamins with Folic Acid 1 mg oral tablet  -- 1 tab(s) by mouth once a day

## 2021-08-11 ENCOUNTER — APPOINTMENT (OUTPATIENT)
Dept: ANTEPARTUM | Facility: CLINIC | Age: 31
End: 2021-08-11

## 2021-08-11 DIAGNOSIS — Z3A.39 39 WEEKS GESTATION OF PREGNANCY: ICD-10-CM

## 2021-09-15 ENCOUNTER — RESULT CHARGE (OUTPATIENT)
Age: 31
End: 2021-09-15

## 2021-09-15 ENCOUNTER — OUTPATIENT (OUTPATIENT)
Dept: OUTPATIENT SERVICES | Facility: HOSPITAL | Age: 31
LOS: 1 days | Discharge: HOME | End: 2021-09-15

## 2021-09-15 ENCOUNTER — APPOINTMENT (OUTPATIENT)
Dept: OBGYN | Facility: CLINIC | Age: 31
End: 2021-09-15
Payer: MEDICAID

## 2021-09-15 VITALS
DIASTOLIC BLOOD PRESSURE: 60 MMHG | SYSTOLIC BLOOD PRESSURE: 100 MMHG | WEIGHT: 175 LBS | HEIGHT: 61 IN | BODY MASS INDEX: 33.04 KG/M2

## 2021-09-15 DIAGNOSIS — Z98.890 OTHER SPECIFIED POSTPROCEDURAL STATES: Chronic | ICD-10-CM

## 2021-09-15 DIAGNOSIS — O99.210 OBESITY COMPLICATING PREGNANCY, UNSPECIFIED TRIMESTER: ICD-10-CM

## 2021-09-15 PROCEDURE — 58300 INSERT INTRAUTERINE DEVICE: CPT

## 2021-09-15 NOTE — HISTORY OF PRESENT ILLNESS
[Postpartum Follow Up] : postpartum follow up [Complications:___] : no complications [] : delivered by vaginal delivery [Breastfeeding] : currently nursing [BF with Difficulty] : nursing without difficulty [Back to Normal] : is back to normal in size [Doing Well] : is doing well [None] : no vaginal bleeding [No Sign of Infection] : is showing no signs of infection [de-identified] : see iud insertion note

## 2021-09-15 NOTE — PROCEDURE
[IUD Placement] : intrauterine device (IUD) placement [Prevention of Pregnancy] : prevention of pregnancy [Benefits] : benefits [Alternatives] : alternatives [Patient] : patient [Infection] : infection [Bleeding] : bleeding [Pain] : pain [Expulsion] : expulsion [Failure] : failure [Uterine Perforation] : uterine perforation [Neg Pregnancy Test] : negative pregnancy test [Betadine] : Betadine [Tenaculum] : Tenaculum [Easy Passage] : Easy passage [ParaGard IUD] : ParaGard IUD [Tolerated Well] : Patient tolerated the procedure well [No Complications] : No complications [None] : None

## 2021-09-15 NOTE — HISTORY OF PRESENT ILLNESS
[Postpartum Follow Up] : postpartum follow up [Complications:___] : no complications [] : delivered by vaginal delivery [Breastfeeding] : currently nursing [BF with Difficulty] : nursing without difficulty [Back to Normal] : is back to normal in size [None] : no vaginal bleeding [Doing Well] : is doing well [No Sign of Infection] : is showing no signs of infection [de-identified] : see iud insertion note

## 2021-09-16 LAB — HCG UR QL: NEGATIVE

## 2021-10-13 ENCOUNTER — APPOINTMENT (OUTPATIENT)
Dept: OBGYN | Facility: CLINIC | Age: 31
End: 2021-10-13
Payer: MEDICAID

## 2021-10-13 ENCOUNTER — OUTPATIENT (OUTPATIENT)
Dept: OUTPATIENT SERVICES | Facility: HOSPITAL | Age: 31
LOS: 1 days | Discharge: HOME | End: 2021-10-13

## 2021-10-13 VITALS
SYSTOLIC BLOOD PRESSURE: 100 MMHG | DIASTOLIC BLOOD PRESSURE: 60 MMHG | BODY MASS INDEX: 33.42 KG/M2 | WEIGHT: 177 LBS | HEIGHT: 61 IN

## 2021-10-13 DIAGNOSIS — Z98.890 OTHER SPECIFIED POSTPROCEDURAL STATES: Chronic | ICD-10-CM

## 2021-10-13 DIAGNOSIS — Z30.430 ENCOUNTER FOR INSERTION OF INTRAUTERINE CONTRACEPTIVE DEVICE: ICD-10-CM

## 2021-10-13 DIAGNOSIS — Z97.5 PRESENCE OF (INTRAUTERINE) CONTRACEPTIVE DEVICE: ICD-10-CM

## 2021-10-13 PROCEDURE — 99212 OFFICE O/P EST SF 10 MIN: CPT

## 2022-03-25 NOTE — DISCHARGE NOTE OB - HOSPITAL COURSE
----- Message from Shaka Carbajal NP sent at 3/24/2022  4:01 PM EDT -----  Regarding: Lipids  Please obtain most recent lipid panel & labs from PCP and place on my desk for review.     Thanks,  [employer] HPI:  The pt is a 28y y/o  @ 39.2  weeks  edc  19  dated by  lmp confirmed by sonogram  who presents to labor & delivery for induction of labor, favorable cervix at term.  Pt reports + fetal movement no srom no vaginal bleeding. (2019 08:48)    S/P uncomplicated vaginal delivery with uneventful PP course. d/slade home on PPD2.

## 2022-06-08 ENCOUNTER — APPOINTMENT (OUTPATIENT)
Dept: OBGYN | Facility: CLINIC | Age: 32
End: 2022-06-08

## 2023-02-01 ENCOUNTER — OUTPATIENT (OUTPATIENT)
Dept: OUTPATIENT SERVICES | Facility: HOSPITAL | Age: 33
LOS: 1 days | Discharge: HOME | End: 2023-02-01

## 2023-02-01 ENCOUNTER — APPOINTMENT (OUTPATIENT)
Dept: OBGYN | Facility: CLINIC | Age: 33
End: 2023-02-01
Payer: MEDICAID

## 2023-02-01 VITALS — WEIGHT: 184 LBS | DIASTOLIC BLOOD PRESSURE: 70 MMHG | BODY MASS INDEX: 34.77 KG/M2 | SYSTOLIC BLOOD PRESSURE: 113 MMHG

## 2023-02-01 DIAGNOSIS — Z00.00 ENCOUNTER FOR GENERAL ADULT MEDICAL EXAMINATION W/OUT ABNORMAL FINDINGS: ICD-10-CM

## 2023-02-01 DIAGNOSIS — Z98.890 OTHER SPECIFIED POSTPROCEDURAL STATES: Chronic | ICD-10-CM

## 2023-02-01 PROCEDURE — 99395 PREV VISIT EST AGE 18-39: CPT

## 2023-02-01 NOTE — HISTORY OF PRESENT ILLNESS
[FreeTextEntry1] : 31 y/o female for annual no complaints [Patient reported PAP Smear was normal] : Patient reported PAP Smear was normal [Y] : Patient uses contraception [IUD] : has an intrauterine device [PapSmeardate] : 2020

## 2023-02-01 NOTE — PHYSICAL EXAM
[Appropriately responsive] : appropriately responsive [Alert] : alert [No Acute Distress] : no acute distress [No Lymphadenopathy] : no lymphadenopathy [Regular Rate Rhythm] : regular rate rhythm [No Murmurs] : no murmurs [Clear to Auscultation B/L] : clear to auscultation bilaterally [Soft] : soft [Non-tender] : non-tender [Non-distended] : non-distended [No HSM] : No HSM [No Lesions] : no lesions [No Mass] : no mass [Oriented x3] : oriented x3 [Examination Of The Breasts] : a normal appearance [No Masses] : no breast masses were palpable [Labia Majora] : normal [Labia Minora] : normal [Normal] : normal [Uterine Adnexae] : normal [FreeTextEntry4] : iud string easily seen

## 2023-02-06 LAB
A VAGINAE DNA VAG QL NAA+PROBE: NORMAL
BVAB2 DNA VAG QL NAA+PROBE: NORMAL
C KRUSEI DNA VAG QL NAA+PROBE: NEGATIVE
C TRACH RRNA SPEC QL NAA+PROBE: NEGATIVE
CYTOLOGY CVX/VAG DOC THIN PREP: NORMAL
HPV HIGH+LOW RISK DNA PNL CVX: NOT DETECTED
MEGA1 DNA VAG QL NAA+PROBE: NORMAL
N GONORRHOEA RRNA SPEC QL NAA+PROBE: NEGATIVE
T VAGINALIS RRNA SPEC QL NAA+PROBE: NEGATIVE

## 2023-10-24 ENCOUNTER — EMERGENCY (EMERGENCY)
Facility: HOSPITAL | Age: 33
LOS: 0 days | Discharge: HOME | End: 2023-10-24
Attending: EMERGENCY MEDICINE
Payer: MEDICAID

## 2023-10-24 VITALS
DIASTOLIC BLOOD PRESSURE: 75 MMHG | TEMPERATURE: 98 F | HEART RATE: 103 BPM | HEIGHT: 62 IN | OXYGEN SATURATION: 100 % | SYSTOLIC BLOOD PRESSURE: 118 MMHG | RESPIRATION RATE: 18 BRPM | WEIGHT: 179.9 LBS

## 2023-10-24 DIAGNOSIS — M79.89 OTHER SPECIFIED SOFT TISSUE DISORDERS: ICD-10-CM

## 2023-10-24 DIAGNOSIS — L02.413 CUTANEOUS ABSCESS OF RIGHT UPPER LIMB: ICD-10-CM

## 2023-10-24 DIAGNOSIS — M79.601 PAIN IN RIGHT ARM: ICD-10-CM

## 2023-10-24 DIAGNOSIS — Z98.890 OTHER SPECIFIED POSTPROCEDURAL STATES: Chronic | ICD-10-CM

## 2023-10-24 PROCEDURE — 99283 EMERGENCY DEPT VISIT LOW MDM: CPT | Mod: 25

## 2023-10-24 PROCEDURE — 10060 I&D ABSCESS SIMPLE/SINGLE: CPT

## 2023-10-24 PROCEDURE — 99283 EMERGENCY DEPT VISIT LOW MDM: CPT

## 2023-10-24 RX ORDER — CEPHALEXIN 500 MG
1 CAPSULE ORAL
Qty: 28 | Refills: 0
Start: 2023-10-24 | End: 2023-10-30

## 2023-10-24 RX ORDER — ACETAMINOPHEN 500 MG
650 TABLET ORAL ONCE
Refills: 0 | Status: COMPLETED | OUTPATIENT
Start: 2023-10-24 | End: 2023-10-24

## 2023-10-24 RX ADMIN — Medication 650 MILLIGRAM(S): at 11:37

## 2023-10-24 RX ADMIN — Medication 650 MILLIGRAM(S): at 12:03

## 2023-10-24 NOTE — ED ADULT NURSE NOTE - NSFALLRISK_ED_ALL_ED
----- Message from Everett Marquez RN sent at 3/3/2017  3:04 PM CST -----  Hey Mrs. Garcia/Meera,     I was wondering if we could schedule a follow up appointment for Mrs. Gandara in 4-6 weeks.    Thanks so much,  UMER SchulerN-RN    No

## 2023-10-24 NOTE — ED ADULT NURSE NOTE - CAS TRG GEN SKIN COLOR
Other (Free Text): Quoted cosmetic electrodessicaion $150 for 10 Detail Level: Zone Note Text (......Xxx Chief Complaint.): This diagnosis correlates with the Other (Free Text): Parents Normal for race

## 2023-10-24 NOTE — ED PROVIDER NOTE - OBJECTIVE STATEMENT
33-year-old female with no past medical history presenting with right arm pain and swelling.  Patient is not sure if she got bit by an insect.  Reports purulent drainage from site.  Has been taking Tylenol with some pain relief.  No fevers or chills.

## 2023-10-24 NOTE — ED PROVIDER NOTE - NS ED ATTENDING STATEMENT MOD
This was a shared visit with the GEMA. I reviewed and verified the documentation and independently performed the documented:

## 2023-10-24 NOTE — ED PROCEDURE NOTE - PROCEDURE ADDITIONAL DETAILS
No incision made, abscess already open and draining. Applied pressure to allow for further drainage.

## 2023-10-24 NOTE — ED PROVIDER NOTE - ATTENDING APP SHARED VISIT CONTRIBUTION OF CARE
32 yo F presents to the ED for R arm swelling and pain. Pt thinks she might have been bit by an insect but now the area is more red, swollen and has some minimal drainage. No fevers or chills.     Const: NAD  Eyes: PERRL, no conjunctival injection  HENT:  Neck supple without meningismus   MSK: No gross deformities appreciated  Skin: Warm, dry. No rashes, pt has swollen erythematous abscess to R bicep with surrounding erythema that is actively draining w  Neuro: Alert, CNs II-XII grossly intact. Sensation and motor function of extremities grossly intact.  Psych: Appropriate mood and affect.    will drain

## 2023-10-24 NOTE — ED PROVIDER NOTE - PHYSICAL EXAMINATION
VITAL SIGNS: I have reviewed nursing notes and confirm.  CONSTITUTIONAL: Patient is in no acute distress.  SKIN: +Right upper arm with induration, surrounding erythema and purulent drainage.   HEAD: Normocephalic; atraumatic.  EYES: PERRL, EOM intact; conjunctiva and sclera clear.  ENT: No nasal discharge; airway clear.   NECK: Supple; non tender.  CARD: S1, S2 normal; no murmurs, gallops, or rubs. Regular rate and rhythm.  RESP: Clear to auscultation bilaterally. No wheezes, rales or rhonchi.  EXT: Normal ROM.   LYMPH: No acute cervical adenopathy.  NEURO: Alert, oriented. Grossly unremarkable. No focal deficits.  PSYCH: Cooperative, appropriate.

## 2023-10-24 NOTE — ED PROVIDER NOTE - CLINICAL SUMMARY MEDICAL DECISION MAKING FREE TEXT BOX
34 yo F presented to ED for abscess which was drained in the ED. Pt also has surrounding erythema concerning for cellulitis. Will send antibiotics to pharmacy and give strict return precautions.

## 2023-10-24 NOTE — ED ADULT NURSE NOTE - NSFALLUNIVINTERV_ED_ALL_ED
Bed/Stretcher in lowest position, wheels locked, appropriate side rails in place/Call bell, personal items and telephone in reach/Instruct patient to call for assistance before getting out of bed/chair/stretcher/Non-slip footwear applied when patient is off stretcher/Hope to call system/Physically safe environment - no spills, clutter or unnecessary equipment/Purposeful proactive rounding/Room/bathroom lighting operational, light cord in reach

## 2023-10-24 NOTE — ED ADULT NURSE NOTE - OBJECTIVE STATEMENT
Pt has abscess that grew over the course of 4 days, on right underside of arm. Pt c/o mild/moderate pain.. No meds taken pta.

## 2023-10-24 NOTE — ED PROVIDER NOTE - PATIENT PORTAL LINK FT
You can access the FollowMyHealth Patient Portal offered by [facility] by registering at the following website: [email or web address]. By joining [employer]’s FollowMyHealth portal, you will also be able to view your health information using other applications (apps) compatible with our system.

## 2024-02-07 ENCOUNTER — APPOINTMENT (OUTPATIENT)
Dept: OBGYN | Facility: CLINIC | Age: 34
End: 2024-02-07

## 2024-03-05 ENCOUNTER — EMERGENCY (EMERGENCY)
Facility: HOSPITAL | Age: 34
LOS: 0 days | Discharge: HOME | End: 2024-03-05
Attending: EMERGENCY MEDICINE
Payer: MEDICAID

## 2024-03-05 VITALS
TEMPERATURE: 99 F | DIASTOLIC BLOOD PRESSURE: 75 MMHG | SYSTOLIC BLOOD PRESSURE: 114 MMHG | RESPIRATION RATE: 18 BRPM | OXYGEN SATURATION: 98 % | HEART RATE: 90 BPM

## 2024-03-05 DIAGNOSIS — Z98.890 OTHER SPECIFIED POSTPROCEDURAL STATES: Chronic | ICD-10-CM

## 2024-03-05 DIAGNOSIS — B34.9 VIRAL INFECTION, UNSPECIFIED: ICD-10-CM

## 2024-03-05 DIAGNOSIS — R50.9 FEVER, UNSPECIFIED: ICD-10-CM

## 2024-03-05 DIAGNOSIS — R51.9 HEADACHE, UNSPECIFIED: ICD-10-CM

## 2024-03-05 DIAGNOSIS — Z20.822 CONTACT WITH AND (SUSPECTED) EXPOSURE TO COVID-19: ICD-10-CM

## 2024-03-05 LAB
FLUAV AG NPH QL: SIGNIFICANT CHANGE UP
FLUBV AG NPH QL: SIGNIFICANT CHANGE UP
RSV RNA NPH QL NAA+NON-PROBE: SIGNIFICANT CHANGE UP
SARS-COV-2 RNA SPEC QL NAA+PROBE: SIGNIFICANT CHANGE UP

## 2024-03-05 PROCEDURE — 99284 EMERGENCY DEPT VISIT MOD MDM: CPT

## 2024-03-05 PROCEDURE — 0241U: CPT

## 2024-03-05 PROCEDURE — 99283 EMERGENCY DEPT VISIT LOW MDM: CPT

## 2024-03-05 RX ORDER — ACETAMINOPHEN 500 MG
650 TABLET ORAL ONCE
Refills: 0 | Status: COMPLETED | OUTPATIENT
Start: 2024-03-05 | End: 2024-03-05

## 2024-03-05 RX ORDER — IBUPROFEN 200 MG
600 TABLET ORAL ONCE
Refills: 0 | Status: COMPLETED | OUTPATIENT
Start: 2024-03-05 | End: 2024-03-05

## 2024-03-05 RX ADMIN — Medication 650 MILLIGRAM(S): at 10:32

## 2024-03-05 RX ADMIN — Medication 600 MILLIGRAM(S): at 10:31

## 2024-03-05 NOTE — ED PROVIDER NOTE - CLINICAL SUMMARY MEDICAL DECISION MAKING FREE TEXT BOX
33-year-old female present to the ED complaining of headache, body aches, watery eyes and pain behind the ears.  Symptom onset for the past 4 to 5 days.  Fever at home.  Vitals were reviewed by me and noted to be within acceptable parameters.  Physical exam here is overall unremarkable and patient is overall well-appearing.  Mild clear drainage from bilateral eyes.  Vision unchanged.  Bilateral TMs unremarkable.  Bilateral posterior auricular lymph node noted.  Posterior pharyngeal exam is unremarkable with no uvular swelling, deviation.  Remainder the physical exam is unremarkable.  Symptoms consistent with viral syndrome.  Viral panel sent.  Advised to take Tylenol for supportive care.  Discharged home.

## 2024-03-05 NOTE — ED PROVIDER NOTE - OBJECTIVE STATEMENT
32 y/o F, no significant PMHx, presents to the ED with complaints of myalgias, fever, watery eyes and pain behind b/l ears (right>left) x 5 days. She denies any fever today however states that she had temp of 101 F 5 days ago. She has been taking Tylenol and Ibuprofen with symptomatic improvement. She denies any recent known sick contacts, sore-throat, rhinorrhea, chest pain, dyspnea, abdominal pain, nausea, vomiting and recent travel.

## 2024-03-05 NOTE — ED PROVIDER NOTE - PATIENT PORTAL LINK FT
You can access the FollowMyHealth Patient Portal offered by [facility] by registering at the following website: [email or web address]. By joining [employer]’s FollowMyHealth portal, you will also be able to view your health information using other applications (apps) compatible with our system.

## 2024-03-05 NOTE — ED PROVIDER NOTE - ENMT, MLM
Airway patent, Nasal mucosa clear. Mouth with normal mucosa. Throat has no vesicles, no oropharyngeal exudates and uvula is midline. mild postauricular adenopathy (right>left)

## 2024-04-20 NOTE — OB PROVIDER DELIVERY SUMMARY - NS_GESTAGEATBIRTHA_OBGYN_ALL_OB_FT
Labs reviewed, we will discuss your lab in detail at the upcoming appointment. This copy is provided for your records.
39w3m

## 2025-02-06 NOTE — ED ADULT TRIAGE NOTE - TEMPERATURE IN CELSIUS (DEGREES C)
36.8
Is This A New Presentation, Or A Follow-Up?: Growths
Have Your Skin Lesions Been Treated?: not been treated